# Patient Record
Sex: FEMALE | URBAN - METROPOLITAN AREA
[De-identification: names, ages, dates, MRNs, and addresses within clinical notes are randomized per-mention and may not be internally consistent; named-entity substitution may affect disease eponyms.]

---

## 2017-12-17 ENCOUNTER — INPATIENT (INPATIENT)
Facility: HOSPITAL | Age: 42
LOS: 1 days | Discharge: PSYCHIATRIC OR SUBSTANCE USE FACILITY | End: 2017-12-19
Attending: HOSPITALIST | Admitting: HOSPITALIST
Payer: MEDICAID

## 2017-12-17 VITALS
SYSTOLIC BLOOD PRESSURE: 137 MMHG | RESPIRATION RATE: 16 BRPM | HEIGHT: 62 IN | TEMPERATURE: 99 F | HEART RATE: 67 BPM | DIASTOLIC BLOOD PRESSURE: 73 MMHG | WEIGHT: 132.06 LBS | OXYGEN SATURATION: 100 %

## 2017-12-17 DIAGNOSIS — F10.231 ALCOHOL DEPENDENCE WITH WITHDRAWAL DELIRIUM: ICD-10-CM

## 2017-12-17 DIAGNOSIS — Z90.49 ACQUIRED ABSENCE OF OTHER SPECIFIED PARTS OF DIGESTIVE TRACT: Chronic | ICD-10-CM

## 2017-12-17 DIAGNOSIS — Z98.82 BREAST IMPLANT STATUS: Chronic | ICD-10-CM

## 2017-12-17 DIAGNOSIS — R45.851 SUICIDAL IDEATIONS: ICD-10-CM

## 2017-12-17 DIAGNOSIS — F32.9 MAJOR DEPRESSIVE DISORDER, SINGLE EPISODE, UNSPECIFIED: ICD-10-CM

## 2017-12-17 DIAGNOSIS — F10.10 ALCOHOL ABUSE, UNCOMPLICATED: ICD-10-CM

## 2017-12-17 LAB
ALBUMIN SERPL ELPH-MCNC: 3.7 G/DL — SIGNIFICANT CHANGE UP (ref 3.3–5)
ALBUMIN SERPL ELPH-MCNC: 3.9 G/DL — SIGNIFICANT CHANGE UP (ref 3.3–5)
ALP SERPL-CCNC: 64 U/L — SIGNIFICANT CHANGE UP (ref 40–120)
ALP SERPL-CCNC: 70 U/L — SIGNIFICANT CHANGE UP (ref 40–120)
ALT FLD-CCNC: 14 U/L — SIGNIFICANT CHANGE UP (ref 4–33)
ALT FLD-CCNC: 18 U/L — SIGNIFICANT CHANGE UP (ref 4–33)
AMPHET UR-MCNC: NEGATIVE — SIGNIFICANT CHANGE UP
APAP SERPL-MCNC: < 15 UG/ML — LOW (ref 15–25)
APPEARANCE UR: SIGNIFICANT CHANGE UP
AST SERPL-CCNC: 20 U/L — SIGNIFICANT CHANGE UP (ref 4–32)
AST SERPL-CCNC: 21 U/L — SIGNIFICANT CHANGE UP (ref 4–32)
BACTERIA # UR AUTO: SIGNIFICANT CHANGE UP
BARBITURATES MEASUREMENT: NEGATIVE — SIGNIFICANT CHANGE UP
BARBITURATES UR SCN-MCNC: NEGATIVE — SIGNIFICANT CHANGE UP
BASOPHILS # BLD AUTO: 0.02 K/UL — SIGNIFICANT CHANGE UP (ref 0–0.2)
BASOPHILS NFR BLD AUTO: 0.2 % — SIGNIFICANT CHANGE UP (ref 0–2)
BENZODIAZ SERPL-MCNC: NEGATIVE — SIGNIFICANT CHANGE UP
BENZODIAZ UR-MCNC: NEGATIVE — SIGNIFICANT CHANGE UP
BILIRUB DIRECT SERPL-MCNC: 0.1 MG/DL — SIGNIFICANT CHANGE UP (ref 0.1–0.2)
BILIRUB SERPL-MCNC: 0.2 MG/DL — SIGNIFICANT CHANGE UP (ref 0.2–1.2)
BILIRUB SERPL-MCNC: 0.2 MG/DL — SIGNIFICANT CHANGE UP (ref 0.2–1.2)
BILIRUB UR-MCNC: NEGATIVE — SIGNIFICANT CHANGE UP
BLOOD UR QL VISUAL: HIGH
BUN SERPL-MCNC: 4 MG/DL — LOW (ref 7–23)
BUN SERPL-MCNC: 8 MG/DL — SIGNIFICANT CHANGE UP (ref 7–23)
CALCIUM SERPL-MCNC: 8 MG/DL — LOW (ref 8.4–10.5)
CALCIUM SERPL-MCNC: 8.3 MG/DL — LOW (ref 8.4–10.5)
CANNABINOIDS UR-MCNC: NEGATIVE — SIGNIFICANT CHANGE UP
CHLORIDE SERPL-SCNC: 105 MMOL/L — SIGNIFICANT CHANGE UP (ref 98–107)
CHLORIDE SERPL-SCNC: 107 MMOL/L — SIGNIFICANT CHANGE UP (ref 98–107)
CK MB BLD-MCNC: 1.1 — SIGNIFICANT CHANGE UP (ref 0–2.5)
CK MB BLD-MCNC: 1.72 NG/ML — SIGNIFICANT CHANGE UP (ref 1–4.7)
CK SERPL-CCNC: 152 U/L — SIGNIFICANT CHANGE UP (ref 25–170)
CO2 SERPL-SCNC: 23 MMOL/L — SIGNIFICANT CHANGE UP (ref 22–31)
CO2 SERPL-SCNC: 25 MMOL/L — SIGNIFICANT CHANGE UP (ref 22–31)
COCAINE METAB.OTHER UR-MCNC: NEGATIVE — SIGNIFICANT CHANGE UP
COLOR SPEC: SIGNIFICANT CHANGE UP
CREAT SERPL-MCNC: 0.76 MG/DL — SIGNIFICANT CHANGE UP (ref 0.5–1.3)
CREAT SERPL-MCNC: 1.05 MG/DL — SIGNIFICANT CHANGE UP (ref 0.5–1.3)
EOSINOPHIL # BLD AUTO: 0.24 K/UL — SIGNIFICANT CHANGE UP (ref 0–0.5)
EOSINOPHIL NFR BLD AUTO: 2.7 % — SIGNIFICANT CHANGE UP (ref 0–6)
ETHANOL BLD-MCNC: < 10 MG/DL — SIGNIFICANT CHANGE UP
GLUCOSE SERPL-MCNC: 110 MG/DL — HIGH (ref 70–99)
GLUCOSE SERPL-MCNC: 97 MG/DL — SIGNIFICANT CHANGE UP (ref 70–99)
GLUCOSE UR-MCNC: NEGATIVE — SIGNIFICANT CHANGE UP
HCG SERPL-ACNC: < 5 MIU/ML — SIGNIFICANT CHANGE UP
HCT VFR BLD CALC: 35.9 % — SIGNIFICANT CHANGE UP (ref 34.5–45)
HGB BLD-MCNC: 11.9 G/DL — SIGNIFICANT CHANGE UP (ref 11.5–15.5)
IMM GRANULOCYTES # BLD AUTO: 0.02 # — SIGNIFICANT CHANGE UP
IMM GRANULOCYTES NFR BLD AUTO: 0.2 % — SIGNIFICANT CHANGE UP (ref 0–1.5)
KETONES UR-MCNC: NEGATIVE — SIGNIFICANT CHANGE UP
LEUKOCYTE ESTERASE UR-ACNC: NEGATIVE — SIGNIFICANT CHANGE UP
LITHIUM SERPL-MCNC: 0.46 MMOL/L — LOW (ref 0.6–1.2)
LYMPHOCYTES # BLD AUTO: 1.35 K/UL — SIGNIFICANT CHANGE UP (ref 1–3.3)
LYMPHOCYTES # BLD AUTO: 15.4 % — SIGNIFICANT CHANGE UP (ref 13–44)
MAGNESIUM SERPL-MCNC: 1.9 MG/DL — SIGNIFICANT CHANGE UP (ref 1.6–2.6)
MCHC RBC-ENTMCNC: 30.3 PG — SIGNIFICANT CHANGE UP (ref 27–34)
MCHC RBC-ENTMCNC: 33.1 % — SIGNIFICANT CHANGE UP (ref 32–36)
MCV RBC AUTO: 91.3 FL — SIGNIFICANT CHANGE UP (ref 80–100)
METHADONE UR-MCNC: NEGATIVE — SIGNIFICANT CHANGE UP
MONOCYTES # BLD AUTO: 0.57 K/UL — SIGNIFICANT CHANGE UP (ref 0–0.9)
MONOCYTES NFR BLD AUTO: 6.5 % — SIGNIFICANT CHANGE UP (ref 2–14)
NEUTROPHILS # BLD AUTO: 6.56 K/UL — SIGNIFICANT CHANGE UP (ref 1.8–7.4)
NEUTROPHILS NFR BLD AUTO: 75 % — SIGNIFICANT CHANGE UP (ref 43–77)
NITRITE UR-MCNC: NEGATIVE — SIGNIFICANT CHANGE UP
NRBC # FLD: 0 — SIGNIFICANT CHANGE UP
OPIATES UR-MCNC: NEGATIVE — SIGNIFICANT CHANGE UP
OXYCODONE UR-MCNC: NEGATIVE — SIGNIFICANT CHANGE UP
PCP UR-MCNC: NEGATIVE — SIGNIFICANT CHANGE UP
PH UR: 7 — SIGNIFICANT CHANGE UP (ref 4.6–8)
PHOSPHATE SERPL-MCNC: 2.1 MG/DL — LOW (ref 2.5–4.5)
PLATELET # BLD AUTO: 243 K/UL — SIGNIFICANT CHANGE UP (ref 150–400)
PMV BLD: 9.3 FL — SIGNIFICANT CHANGE UP (ref 7–13)
POTASSIUM SERPL-MCNC: 3.8 MMOL/L — SIGNIFICANT CHANGE UP (ref 3.5–5.3)
POTASSIUM SERPL-MCNC: 3.9 MMOL/L — SIGNIFICANT CHANGE UP (ref 3.5–5.3)
POTASSIUM SERPL-SCNC: 3.8 MMOL/L — SIGNIFICANT CHANGE UP (ref 3.5–5.3)
POTASSIUM SERPL-SCNC: 3.9 MMOL/L — SIGNIFICANT CHANGE UP (ref 3.5–5.3)
PROT SERPL-MCNC: 6 G/DL — SIGNIFICANT CHANGE UP (ref 6–8.3)
PROT SERPL-MCNC: 6.5 G/DL — SIGNIFICANT CHANGE UP (ref 6–8.3)
PROT UR-MCNC: 100 MG/DL — HIGH
RBC # BLD: 3.93 M/UL — SIGNIFICANT CHANGE UP (ref 3.8–5.2)
RBC # FLD: 12.3 % — SIGNIFICANT CHANGE UP (ref 10.3–14.5)
RBC CASTS # UR COMP ASSIST: HIGH (ref 0–?)
SALICYLATES SERPL-MCNC: < 5 MG/DL — LOW (ref 15–30)
SODIUM SERPL-SCNC: 140 MMOL/L — SIGNIFICANT CHANGE UP (ref 135–145)
SODIUM SERPL-SCNC: 142 MMOL/L — SIGNIFICANT CHANGE UP (ref 135–145)
SP GR SPEC: 1.02 — SIGNIFICANT CHANGE UP (ref 1–1.04)
SQUAMOUS # UR AUTO: SIGNIFICANT CHANGE UP
TSH SERPL-MCNC: 2.58 UIU/ML — SIGNIFICANT CHANGE UP (ref 0.27–4.2)
UROBILINOGEN FLD QL: NORMAL MG/DL — SIGNIFICANT CHANGE UP
WBC # BLD: 8.76 K/UL — SIGNIFICANT CHANGE UP (ref 3.8–10.5)
WBC # FLD AUTO: 8.76 K/UL — SIGNIFICANT CHANGE UP (ref 3.8–10.5)
WBC UR QL: HIGH (ref 0–?)

## 2017-12-17 PROCEDURE — 99223 1ST HOSP IP/OBS HIGH 75: CPT | Mod: AI

## 2017-12-17 RX ORDER — SODIUM CHLORIDE 9 MG/ML
1000 INJECTION, SOLUTION INTRAVENOUS
Refills: 0 | Status: DISCONTINUED | OUTPATIENT
Start: 2017-12-17 | End: 2017-12-19

## 2017-12-17 RX ORDER — SODIUM CHLORIDE 9 MG/ML
1000 INJECTION INTRAMUSCULAR; INTRAVENOUS; SUBCUTANEOUS ONCE
Refills: 0 | Status: COMPLETED | OUTPATIENT
Start: 2017-12-17 | End: 2017-12-17

## 2017-12-17 RX ORDER — THIAMINE MONONITRATE (VIT B1) 100 MG
100 TABLET ORAL DAILY
Refills: 0 | Status: DISCONTINUED | OUTPATIENT
Start: 2017-12-18 | End: 2017-12-19

## 2017-12-17 RX ORDER — CLONAZEPAM 1 MG
0.5 TABLET ORAL DAILY
Refills: 0 | Status: DISCONTINUED | OUTPATIENT
Start: 2017-12-17 | End: 2017-12-17

## 2017-12-17 RX ORDER — LITHIUM CARBONATE 300 MG/1
300 TABLET, EXTENDED RELEASE ORAL
Refills: 0 | Status: DISCONTINUED | OUTPATIENT
Start: 2017-12-17 | End: 2017-12-19

## 2017-12-17 RX ORDER — FLUOXETINE HCL 10 MG
20 CAPSULE ORAL DAILY
Refills: 0 | Status: DISCONTINUED | OUTPATIENT
Start: 2017-12-17 | End: 2017-12-18

## 2017-12-17 RX ORDER — CLONAZEPAM 1 MG
0.5 TABLET ORAL AT BEDTIME
Refills: 0 | Status: DISCONTINUED | OUTPATIENT
Start: 2017-12-17 | End: 2017-12-18

## 2017-12-17 RX ORDER — FOLIC ACID 0.8 MG
1 TABLET ORAL DAILY
Refills: 0 | Status: DISCONTINUED | OUTPATIENT
Start: 2017-12-18 | End: 2017-12-19

## 2017-12-17 RX ORDER — INFLUENZA VIRUS VACCINE 15; 15; 15; 15 UG/.5ML; UG/.5ML; UG/.5ML; UG/.5ML
0.5 SUSPENSION INTRAMUSCULAR ONCE
Refills: 0 | Status: COMPLETED | OUTPATIENT
Start: 2017-12-17 | End: 2017-12-18

## 2017-12-17 RX ORDER — TOPIRAMATE 25 MG
50 TABLET ORAL
Refills: 0 | Status: DISCONTINUED | OUTPATIENT
Start: 2017-12-17 | End: 2017-12-19

## 2017-12-17 RX ORDER — TRAZODONE HCL 50 MG
50 TABLET ORAL AT BEDTIME
Refills: 0 | Status: DISCONTINUED | OUTPATIENT
Start: 2017-12-17 | End: 2017-12-19

## 2017-12-17 RX ADMIN — Medication 2 MILLIGRAM(S): at 16:51

## 2017-12-17 RX ADMIN — SODIUM CHLORIDE 1000 MILLILITER(S): 9 INJECTION INTRAMUSCULAR; INTRAVENOUS; SUBCUTANEOUS at 16:50

## 2017-12-17 RX ADMIN — SODIUM CHLORIDE 75 MILLILITER(S): 9 INJECTION, SOLUTION INTRAVENOUS at 21:14

## 2017-12-17 RX ADMIN — LITHIUM CARBONATE 300 MILLIGRAM(S): 300 TABLET, EXTENDED RELEASE ORAL at 21:16

## 2017-12-17 RX ADMIN — Medication 50 MILLIGRAM(S): at 21:16

## 2017-12-17 RX ADMIN — Medication 0.5 MILLIGRAM(S): at 21:13

## 2017-12-17 NOTE — H&P ADULT - PROBLEM SELECTOR PLAN 3
- await psych eval   - would check lithium level  - continue with home medications for now  - would decrease trazodone to 50 mg - it was recently increased to 100 mg and pt feels she has been having more trouble sleeping since the increase

## 2017-12-17 NOTE — H&P ADULT - HISTORY OF PRESENT ILLNESS
42F with depression and anxiety presenting with binge drinking from thursday afternoon to friday night due to relationship stressors.  She does not know the amount that she drank but there was Vodka, Whiskey and beer consumed.  She does not drink on a daily basis - prior to this binge drinking episode she last drank the first week of december.  She has never been hospitalized for etoh withdrawal.  Currently she is having tremors but is able to speak in full coherent sentences.    She came to the hospital because she began to have hallucinations yesterday - she was seeing people, animals and objects in the room with her.  She denies auditory hallucinations.  she denies homicidal ideations but does say she has thought about suicide multiple times the past few days.  She states if she were to commit suicide she would use a butterfly knife.  She has a history of self-injurious behavior during her teenage years when she used to cut her wrists (scars on L wrist present).    Of note, pt's trazodone was recently increased from 50 mg to 100 mg at night.

## 2017-12-17 NOTE — ED ADULT NURSE NOTE - OBJECTIVE STATEMENT
Immediate Brief Procedure Note    Patient: Trina Corcoran    Preoperative Diagnosis: Epigastric abdominal pain [R10.13]  History of colon polyps [Z86.010]     Postoperative Diagnosis: Epigastric abdominal pain [R10.13]  History of colon polyps [Z86.010]    Procedure: Procedure(s) (LRB):  ESOPHAGOGASTRODUODENOSCOPY (N/A)  COLONOSCOPY (N/A)     Surgeon:  Matthew Mcdaniel MD    Assistants: None    Anesthesia Staff: RN or CRNA    Anesthesia Type: MAC    Findings: EGD showed small hiatal hernia, 5 mm mid esophageal nodule suggesting a squamous papilloma  Colonoscopy showed left-sided diverticulosis, tortuosity sigmoid colon and small colon polyps    Estimated Blood Loss: None    Complications: None    Specimens Removed: Esophageal biopsies, colon polyps             Pt to bed 27. Alert and oriented x 3. Pt c/o SI and tremors from alcohol use. Pt cooperative. Pt placed on constant observation. All belongings collected and placed in locker across from rm 21. IVL placed. Bloods drawn. Safety maintained. Will continue to monitor.

## 2017-12-17 NOTE — H&P ADULT - NSHPPHYSICALEXAM_GEN_ALL_CORE
Vital Signs Last 24 Hrs  T(C): 36.8 (17 Dec 2017 17:03), Max: 37.2 (17 Dec 2017 16:07)  T(F): 98.3 (17 Dec 2017 17:03), Max: 98.9 (17 Dec 2017 16:07)  HR: 60 (17 Dec 2017 17:03) (60 - 67)  BP: 135/75 (17 Dec 2017 17:03) (135/75 - 137/73)  BP(mean): --  RR: 18 (17 Dec 2017 17:03) (16 - 18)  SpO2: 100% (17 Dec 2017 17:03) (100% - 100%)    PHYSICAL EXAM:  GENERAL: NAD, well-developed  HEAD:  Atraumatic, Normocephalic  EYES: EOMI, conjunctiva and sclera clear  NECK: Supple, No JVD  CHEST/LUNG: Clear to auscultation bilaterally; No wheeze  HEART: Regular rate and rhythm; No murmurs  ABDOMEN: Soft, Nontender, Nondistended; Bowel sounds present  EXTREMITIES:  2+ Peripheral Pulses, No edema; + asterixis   PSYCH: AAOx3  NEUROLOGY: non-focal  SKIN: No rashes or lesions Vital Signs Last 24 Hrs  T(C): 36.8 (17 Dec 2017 17:03), Max: 37.2 (17 Dec 2017 16:07)  T(F): 98.3 (17 Dec 2017 17:03), Max: 98.9 (17 Dec 2017 16:07)  HR: 60 (17 Dec 2017 17:03) (60 - 67)  BP: 135/75 (17 Dec 2017 17:03) (135/75 - 137/73)  BP(mean): --  RR: 18 (17 Dec 2017 17:03) (16 - 18)  SpO2: 100% (17 Dec 2017 17:03) (100% - 100%)    PHYSICAL EXAM:  GENERAL: NAD, well-developed  HEAD:  Atraumatic, Normocephalic  EYES: EOMI, conjunctiva and sclera clear  NECK: Supple, No JVD  CHEST/LUNG: Clear to auscultation bilaterally; No wheeze  HEART: Regular rate and rhythm; No murmurs  ABDOMEN: Soft, Nontender, Nondistended; Bowel sounds present  EXTREMITIES:  2+ Peripheral Pulses, No edema; + asterixis; + scars on L wrist   PSYCH: AAOx3  NEUROLOGY: non-focal  SKIN: No rashes or lesions

## 2017-12-17 NOTE — ED PROVIDER NOTE - OBJECTIVE STATEMENT
41 y/o F with h/o  csection x2, cholecystectomy, breast augmentation, depression, anxiety, alcohol abuse with treatment for rehab at age of 14-15, multiple psych admissions p/w worsening depression for few days and then started binge drinking from Thursday to friday and now feels worse and feels suicidal and wants to hurt herself but has no plan. Pt moved from [place] back to  recently and lives with parents.Pt now feels very weak, shaky and also smoked marijuana to relax herself. Pt denies other drug abuse, denies pregnancy

## 2017-12-17 NOTE — ED PROVIDER NOTE - PMH
Alcohol abuse    Anxiety    Severe episode of recurrent major depressive disorder, with psychotic features

## 2017-12-17 NOTE — ED PROVIDER NOTE - CRANIAL NERVE AND PUPILLARY EXAM
cranial nerves 2-12 intact/cough reflex intact/corneal reflex intact/central and peripheral vision intact/peripheral vision intact/extra-ocular movements intact/gag reflex intact/tongue is midline

## 2017-12-17 NOTE — ED PROVIDER NOTE - MEDICAL DECISION MAKING DETAILS
PLAN TO CHECK LABS, hydrate, give ativan, will place on 1:1 for suicidal ideation and will consult psych

## 2017-12-17 NOTE — ED ADULT TRIAGE NOTE - CHIEF COMPLAINT QUOTE
Pt. presents to ER for depression and anxiety. States she has been having visual hallucinations x 1 week and SI starting today. h/o SI attempts of cutting in past. Recently started taking Trazadone. Pt. crying in triage.   Pt. also binge drank thurs-friday and is going through withdrawal. Tremors seen in triage. Pt. calm and cooperative.

## 2017-12-17 NOTE — H&P ADULT - PROBLEM SELECTOR PLAN 1
- would start symptom triggered CIWA   - depending on scores she may need a taper added   - MVI, folate, thiamine

## 2017-12-17 NOTE — H&P ADULT - NSHPREVIEWOFSYSTEMS_GEN_ALL_CORE
CONSTITUTIONAL: No fever, weight loss, or fatigue  EYES: No eye pain or discharge  ENMT:  No difficulty hearing, tinnitus, vertigo; No sinus or throat pain  NECK: No pain or stiffness  BREASTS: No pain, masses, or nipple discharge  RESPIRATORY: No cough, wheezing, chills or hemoptysis; No shortness of breath  CARDIOVASCULAR: No chest pain, palpitations, dizziness, or leg swelling  GASTROINTESTINAL: No abdominal or epigastric pain. No nausea, vomiting, or hematemesis; No diarrhea or constipation. No melena or hematochezia.  GENITOURINARY: No dysuria, frequency, hematuria, or incontinence  NEUROLOGICAL: No headaches, memory loss, loss of strength, numbness, or tremors  SKIN: No itching, burning, rashes, or lesions   LYMPH NODES: No enlarged glands  ENDOCRINE: No heat or cold intolerance; No hair loss  MUSCULOSKELETAL: No joint pain or swelling; No muscle, back, or extremity pain; + tremors in UEs  PSYCHIATRIC: + depression, anxiety, and difficulty sleeping; + visual hallucinations + SI  HEME/LYMPH: No easy bruising, or bleeding gums  ALLERY AND IMMUNOLOGIC: No hives or eczema

## 2017-12-17 NOTE — H&P ADULT - NSHPLABSRESULTS_GEN_ALL_CORE
LABS:                        11.9   8.76  )-----------( 243      ( 17 Dec 2017 16:32 )             35.9     12-17    142  |  107  |  4<L>  ----------------------------<  97  3.9   |  23  |  0.76    Ca    8.3<L>      17 Dec 2017 16:32    TPro  6.5  /  Alb  3.9  /  TBili  0.2  /  DBili  x   /  AST  20  /  ALT  18  /  AlkPhos  70  12-17    CARDIAC MARKERS ( 17 Dec 2017 16:32 )  x     / x     / 152 u/L / 1.72 ng/mL / x          Urinalysis Basic - ( 17 Dec 2017 17:26 )    Color: PLYEL / Appearance: TURBID / S.017 / pH: 7.0  Gluc: NEGATIVE / Ketone: NEGATIVE  / Bili: NEGATIVE / Urobili: NORMAL mg/dL   Blood: SMALL / Protein: 100 mg/dL / Nitrite: NEGATIVE   Leuk Esterase: NEGATIVE / RBC: 5-10 / WBC 5-10   Sq Epi: MANY / Non Sq Epi: x / Bacteria: FEW

## 2017-12-18 ENCOUNTER — TRANSCRIPTION ENCOUNTER (OUTPATIENT)
Age: 42
End: 2017-12-18

## 2017-12-18 DIAGNOSIS — F60.9 PERSONALITY DISORDER, UNSPECIFIED: ICD-10-CM

## 2017-12-18 LAB
BASOPHILS # BLD AUTO: 0.03 K/UL — SIGNIFICANT CHANGE UP (ref 0–0.2)
BASOPHILS NFR BLD AUTO: 0.4 % — SIGNIFICANT CHANGE UP (ref 0–2)
BUN SERPL-MCNC: 8 MG/DL — SIGNIFICANT CHANGE UP (ref 7–23)
CALCIUM SERPL-MCNC: 7.6 MG/DL — LOW (ref 8.4–10.5)
CHLORIDE SERPL-SCNC: 110 MMOL/L — HIGH (ref 98–107)
CO2 SERPL-SCNC: 26 MMOL/L — SIGNIFICANT CHANGE UP (ref 22–31)
CREAT SERPL-MCNC: 0.84 MG/DL — SIGNIFICANT CHANGE UP (ref 0.5–1.3)
EOSINOPHIL # BLD AUTO: 0.35 K/UL — SIGNIFICANT CHANGE UP (ref 0–0.5)
EOSINOPHIL NFR BLD AUTO: 4.5 % — SIGNIFICANT CHANGE UP (ref 0–6)
GLUCOSE SERPL-MCNC: 77 MG/DL — SIGNIFICANT CHANGE UP (ref 70–99)
HCT VFR BLD CALC: 32.3 % — LOW (ref 34.5–45)
HGB BLD-MCNC: 10.7 G/DL — LOW (ref 11.5–15.5)
IMM GRANULOCYTES # BLD AUTO: 0.02 # — SIGNIFICANT CHANGE UP
IMM GRANULOCYTES NFR BLD AUTO: 0.3 % — SIGNIFICANT CHANGE UP (ref 0–1.5)
LYMPHOCYTES # BLD AUTO: 1.37 K/UL — SIGNIFICANT CHANGE UP (ref 1–3.3)
LYMPHOCYTES # BLD AUTO: 17.6 % — SIGNIFICANT CHANGE UP (ref 13–44)
MCHC RBC-ENTMCNC: 31.5 PG — SIGNIFICANT CHANGE UP (ref 27–34)
MCHC RBC-ENTMCNC: 33.1 % — SIGNIFICANT CHANGE UP (ref 32–36)
MCV RBC AUTO: 95 FL — SIGNIFICANT CHANGE UP (ref 80–100)
MONOCYTES # BLD AUTO: 0.61 K/UL — SIGNIFICANT CHANGE UP (ref 0–0.9)
MONOCYTES NFR BLD AUTO: 7.8 % — SIGNIFICANT CHANGE UP (ref 2–14)
NEUTROPHILS # BLD AUTO: 5.4 K/UL — SIGNIFICANT CHANGE UP (ref 1.8–7.4)
NEUTROPHILS NFR BLD AUTO: 69.4 % — SIGNIFICANT CHANGE UP (ref 43–77)
NRBC # FLD: 0 — SIGNIFICANT CHANGE UP
PLATELET # BLD AUTO: 176 K/UL — SIGNIFICANT CHANGE UP (ref 150–400)
PMV BLD: 9.5 FL — SIGNIFICANT CHANGE UP (ref 7–13)
POTASSIUM SERPL-MCNC: 4.1 MMOL/L — SIGNIFICANT CHANGE UP (ref 3.5–5.3)
POTASSIUM SERPL-SCNC: 4.1 MMOL/L — SIGNIFICANT CHANGE UP (ref 3.5–5.3)
RBC # BLD: 3.4 M/UL — LOW (ref 3.8–5.2)
RBC # FLD: 12.4 % — SIGNIFICANT CHANGE UP (ref 10.3–14.5)
SODIUM SERPL-SCNC: 142 MMOL/L — SIGNIFICANT CHANGE UP (ref 135–145)
WBC # BLD: 7.78 K/UL — SIGNIFICANT CHANGE UP (ref 3.8–10.5)
WBC # FLD AUTO: 7.78 K/UL — SIGNIFICANT CHANGE UP (ref 3.8–10.5)

## 2017-12-18 PROCEDURE — 99255 IP/OBS CONSLTJ NEW/EST HI 80: CPT

## 2017-12-18 RX ORDER — HALOPERIDOL DECANOATE 100 MG/ML
2 INJECTION INTRAMUSCULAR EVERY 6 HOURS
Refills: 0 | Status: DISCONTINUED | OUTPATIENT
Start: 2017-12-18 | End: 2017-12-19

## 2017-12-18 RX ORDER — TRAZODONE HCL 50 MG
50 TABLET ORAL ONCE
Refills: 0 | Status: COMPLETED | OUTPATIENT
Start: 2017-12-18 | End: 2017-12-18

## 2017-12-18 RX ORDER — TRAZODONE HCL 50 MG
1 TABLET ORAL
Qty: 0 | Refills: 0 | DISCHARGE
Start: 2017-12-18

## 2017-12-18 RX ORDER — CLONAZEPAM 1 MG
0.5 TABLET ORAL
Refills: 0 | Status: DISCONTINUED | OUTPATIENT
Start: 2017-12-18 | End: 2017-12-19

## 2017-12-18 RX ORDER — THIAMINE MONONITRATE (VIT B1) 100 MG
1 TABLET ORAL
Qty: 0 | Refills: 0 | DISCHARGE
Start: 2017-12-18

## 2017-12-18 RX ORDER — FOLIC ACID 0.8 MG
1 TABLET ORAL
Qty: 0 | Refills: 0 | DISCHARGE
Start: 2017-12-18

## 2017-12-18 RX ADMIN — Medication 100 MILLIGRAM(S): at 11:34

## 2017-12-18 RX ADMIN — Medication 0.5 MILLIGRAM(S): at 17:07

## 2017-12-18 RX ADMIN — SODIUM CHLORIDE 75 MILLILITER(S): 9 INJECTION, SOLUTION INTRAVENOUS at 05:33

## 2017-12-18 RX ADMIN — Medication 50 MILLIGRAM(S): at 21:08

## 2017-12-18 RX ADMIN — LITHIUM CARBONATE 300 MILLIGRAM(S): 300 TABLET, EXTENDED RELEASE ORAL at 17:07

## 2017-12-18 RX ADMIN — Medication 20 MILLIGRAM(S): at 05:36

## 2017-12-18 RX ADMIN — Medication 1 TABLET(S): at 11:34

## 2017-12-18 RX ADMIN — Medication 1 MILLIGRAM(S): at 14:32

## 2017-12-18 RX ADMIN — Medication 50 MILLIGRAM(S): at 17:08

## 2017-12-18 RX ADMIN — Medication 1 MILLIGRAM(S): at 11:34

## 2017-12-18 RX ADMIN — Medication 1 MILLIGRAM(S): at 22:22

## 2017-12-18 RX ADMIN — Medication 50 MILLIGRAM(S): at 01:05

## 2017-12-18 RX ADMIN — INFLUENZA VIRUS VACCINE 0.5 MILLILITER(S): 15; 15; 15; 15 SUSPENSION INTRAMUSCULAR at 13:07

## 2017-12-18 RX ADMIN — Medication 50 MILLIGRAM(S): at 05:32

## 2017-12-18 RX ADMIN — LITHIUM CARBONATE 300 MILLIGRAM(S): 300 TABLET, EXTENDED RELEASE ORAL at 05:32

## 2017-12-18 NOTE — CHART NOTE - NSCHARTNOTEFT_GEN_A_CORE
Received 50mg Trazodone as ordered and states still can't sleep.  Her recently increased dose to 100mg was decreased 2/2 complaints "pt feels she has been having more trouble sleeping since the increase", per H&P.  Will now give additional dose of 50mg Trazodone to make up the 100mg and monitor for reaction to Rx.

## 2017-12-18 NOTE — BEHAVIORAL HEALTH ASSESSMENT NOTE - NSBHMEDSOTHERFT_PSY_A_CORE
Li 300mg BID, prozac 20mg daily, klonopin 0.5mg QHS, trazodone 100mg QHS, Topamax 50mg BID (for migraines)

## 2017-12-18 NOTE — BEHAVIORAL HEALTH ASSESSMENT NOTE - NSBHADMITIPSTRENGTH_PSY_A_CORE
Attempting to realize his/her potential/Has access to housing/residential stability/Cooperative with treatment

## 2017-12-18 NOTE — DISCHARGE NOTE ADULT - MEDICATION SUMMARY - MEDICATIONS TO TAKE
I will START or STAY ON the medications listed below when I get home from the hospital:    topiramate 50 mg oral tablet  -- 1 tab(s) by mouth 2 times a day  -- Indication: For MDD    KlonoPIN 0.5 mg oral tablet  -- 1 tab(s) by mouth once a day at bedtime   -- Indication: For Anxiety    traZODone 50 mg oral tablet  -- 1 tab(s) by mouth once a day (at bedtime)  -- Indication: For Depression      PROzac 20 mg oral capsule  -- 1 cap(s) by mouth once a day  -- Indication: For Depression, unspecified depression type    lithium 300 mg oral capsule  -- orally 2 times a day  -- Indication: For Depresion    Multiple Vitamins oral tablet  -- 1 tab(s) by mouth once a day  -- Indication: For Suppelement    folic acid 1 mg oral tablet  -- 1 tab(s) by mouth once a day  -- Indication: For Supplement    thiamine 100 mg oral tablet  -- 1 tab(s) by mouth once a day  -- Indication: For Supplement I will START or STAY ON the medications listed below when I get home from the hospital:    topiramate 50 mg oral tablet  -- 1 tab(s) by mouth 2 times a day  -- Indication: For MDD    LORazepam 1 mg oral tablet  -- 1 tab(s) by mouth 4 times a day, As needed, Anxiety  -- Indication: For Anxiety    clonazePAM 0.5 mg oral tablet  -- 1 tab(s) by mouth 2 times a day  -- Indication: For Depression, unspecified depression type    PROzac 20 mg oral capsule  -- 1 cap(s) by mouth once a day  -- Indication: For Depression, unspecified depression type    traZODone 50 mg oral tablet  -- 1 tab(s) by mouth once a day (at bedtime)  -- Indication: For Depression      lithium 300 mg oral capsule  -- orally 2 times a day  -- Indication: For Depresion    Multiple Vitamins oral tablet  -- 1 tab(s) by mouth once a day  -- Indication: For Suppelement    folic acid 1 mg oral tablet  -- 1 tab(s) by mouth once a day  -- Indication: For Supplement    thiamine 100 mg oral tablet  -- 1 tab(s) by mouth once a day  -- Indication: For Supplement I will START or STAY ON the medications listed below when I get home from the hospital:    topiramate 50 mg oral tablet  -- 1 tab(s) by mouth 2 times a day  -- Indication: For MDD    LORazepam 1 mg oral tablet  -- 1 tab(s) by mouth 4 times a day, As needed, Anxiety  -- Indication: For Anxiety    clonazePAM 0.5 mg oral tablet  -- 1 tab(s) by mouth 2 times a day  -- Indication: For Depression, unspecified depression type    traZODone 50 mg oral tablet  -- 1 tab(s) by mouth once a day (at bedtime)  -- Indication: For Depression      lithium 300 mg oral capsule  -- orally 2 times a day  -- Indication: For Depresion    Multiple Vitamins oral tablet  -- 1 tab(s) by mouth once a day  -- Indication: For Suppelement    folic acid 1 mg oral tablet  -- 1 tab(s) by mouth once a day  -- Indication: For Supplement    thiamine 100 mg oral tablet  -- 1 tab(s) by mouth once a day  -- Indication: For Supplement

## 2017-12-18 NOTE — BEHAVIORAL HEALTH ASSESSMENT NOTE - RISK ASSESSMENT
Risk factors include hx of suicide attempt, disconnected from outpatient treatment, fam hx suicide, acute stressors, unemployed, substance abuse, unable to engage in safety planning.

## 2017-12-18 NOTE — BEHAVIORAL HEALTH ASSESSMENT NOTE - SUMMARY
Patient is a 41 y/o  F with reported PPhx of "chronic depression but rule out bipolar d/o", PMDD, post-traumatic stress disorder, and ETOH abuse, 2 prior hospitalizations 1 at [facility] and last one in [place] 1 year ago, one reported suicide attempt at age 16 by slicing wrists, no hx of violence/legal problems, PMhx significant for migraine HA. Patient presented to ED endorsing worsening depressed mood and suicidal ideation in the context of multiple acute stressors and heavy alcohol use. Patient was admitted due to concern for ETOH w/d however pt has been medically stable during hospitalization with no signs/symptoms of withdrawal. Patient reports active suicidal ideation at present with aborted attempt yesterday to slice a major artery with a butterfly knife, and envisions herself dying in a car accident like her ex- did (anniversary of his death is next week). At this time pt is at imminent risk of harm and requires inpt psych hospitalization for safety and stabilization. Patient is agreeable to voluntary admission. Bed secured at [facility] unit 2N and handoff provided to Dr. Marie.

## 2017-12-18 NOTE — DISCHARGE NOTE ADULT - PLAN OF CARE
resolution of delirium folic acid, thiamine, and multi vitamin Being admitted to  for further treatment.

## 2017-12-18 NOTE — DISCHARGE NOTE ADULT - HOSPITAL COURSE
42F with depression and anxiety presenting with binge drinking from Thursday afternoon to Friday night due to relationship stressors.  She does not know the amount that she drank but there was Vodka, Whiskey and beer consumed.  She does not drink on a daily basis - prior to this binge drinking episode she last drank the first week of december.  She has never been hospitalized for ETOH withdrawal.  Currently she is having tremors but is able to speak in full coherent sentences.    She came to the hospital because she began to have hallucinations yesterday - she was seeing people, animals and objects in the room with her.  She denies auditory hallucinations.  she denies homicidal ideations but does say she has thought about suicide multiple times the past few days.  She states if she were to commit suicide she would use a butterfly knife.  She has a history of self-injurious behavior during her teenage years when she used to cut her wrists (scars on L wrist present).  Pt admitted to [facility] and was placed on constant observation and started on CIWA protocol. Psychiatry has seen the patient and deemed her a candidate for inpatient psych admission. Pt is now medically cleared for transfer to [facility].

## 2017-12-18 NOTE — BEHAVIORAL HEALTH ASSESSMENT NOTE - NSBHSUICRISKFACTOR_PSY_A_CORE
Hopelessness/Mood episode/Substance abuse/dependence/Agitation/severe anxiety/Perceived burden on family and others/Chronic pain or acute medical issue/Access to means (pills, firearms, etc.)/Unable to engage in safety planning/Family history of suicide/Global insomnia/Anhedonia/Impulsivity
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
## 2017-12-18 NOTE — BEHAVIORAL HEALTH ASSESSMENT NOTE - DIFFERENTIAL
major depressive disorder recurrent vs bipolar d/o depressed vs substance induced depressive disorder, r/o borderline PD

## 2017-12-18 NOTE — DISCHARGE NOTE ADULT - CARE PLAN
Principal Discharge DX:	Alcohol withdrawal delirium  Goal:	resolution of delirium  Instructions for follow-up, activity and diet:	folic acid, thiamine, and multi vitamin  Secondary Diagnosis:	Suicidal ideation  Instructions for follow-up, activity and diet:	Being admitted to [facility] for further treatment.

## 2017-12-18 NOTE — DISCHARGE NOTE ADULT - PATIENT PORTAL LINK FT
“You can access the FollowHealth Patient Portal, offered by [facility], by registering with the following website: [email or web address]”

## 2017-12-18 NOTE — BEHAVIORAL HEALTH ASSESSMENT NOTE - CASE SUMMARY
43 yo female with major depressive disorder with suicidal thoughts, alcohol abuse, need inpatient psychiatric admission. Will admit to [facility]   continue current meds, sign out give to Dr. Marie

## 2017-12-18 NOTE — DISCHARGE NOTE ADULT - MEDICATION SUMMARY - MEDICATIONS TO STOP TAKING
I will STOP taking the medications listed below when I get home from the hospital:  None I will STOP taking the medications listed below when I get home from the hospital:    PROzac 20 mg oral capsule  -- 1 cap(s) by mouth once a day

## 2017-12-18 NOTE — BEHAVIORAL HEALTH ASSESSMENT NOTE - OTHER
3 times breakup with BF, moving back in with parents, anniversary of first 's death next week thin intact in hospital setting pt in bed tearful

## 2017-12-18 NOTE — BEHAVIORAL HEALTH ASSESSMENT NOTE - NSBHCHARTREVIEWVS_PSY_A_CORE FT
Vital Signs Last 24 Hrs  T(C): 37.2 (18 Dec 2017 09:45), Max: 37.2 (17 Dec 2017 16:07)  T(F): 98.9 (18 Dec 2017 09:45), Max: 98.9 (17 Dec 2017 16:07)  HR: 70 (18 Dec 2017 09:45) (53 - 78)  BP: 114/81 (18 Dec 2017 09:45) (90/56 - 137/73)  BP(mean): --  RR: 18 (18 Dec 2017 09:45) (16 - 18)  SpO2: 100% (18 Dec 2017 09:45) (100% - 100%)

## 2017-12-18 NOTE — CHART NOTE - NSCHARTNOTEFT_GEN_A_CORE
Patient seen and examined. Psychiatry saw the patient this morning and deemed her a candidate for inpatient psych admission. Pt is now medically cleared for transfer to [facility].   See discharge summary in sunrise.   Time spent discharging patient >60min.  Case discussed with patient and TelePA.

## 2017-12-18 NOTE — BEHAVIORAL HEALTH ASSESSMENT NOTE - NSBHCHARTREVIEWLAB_PSY_A_CORE FT
12-18    142  |  110<H>  |  8   ----------------------------<  77  4.1   |  26  |  0.84    Ca    7.6<L>      18 Dec 2017 07:31  Phos  2.1     12-17  Mg     1.9     12-17    TPro  6.0  /  Alb  3.7  /  TBili  0.2  /  DBili  0.1  /  AST  21  /  ALT  14  /  AlkPhos  64  12-17                          10.7   7.78  )-----------( 176      ( 18 Dec 2017 07:31 )             32.3     Ca 8.0  Mg 1.9  TSH 2.58  UA negative  Utox negative  Li level last night 7pm: 0.46
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
## 2017-12-19 ENCOUNTER — INPATIENT (INPATIENT)
Facility: HOSPITAL | Age: 42
LOS: 2 days | Discharge: HOME | End: 2017-12-22
Attending: STUDENT IN AN ORGANIZED HEALTH CARE EDUCATION/TRAINING PROGRAM | Admitting: STUDENT IN AN ORGANIZED HEALTH CARE EDUCATION/TRAINING PROGRAM
Payer: MEDICAID

## 2017-12-19 VITALS
HEART RATE: 76 BPM | RESPIRATION RATE: 16 BRPM | OXYGEN SATURATION: 99 % | TEMPERATURE: 98 F | DIASTOLIC BLOOD PRESSURE: 69 MMHG | SYSTOLIC BLOOD PRESSURE: 102 MMHG

## 2017-12-19 VITALS — RESPIRATION RATE: 17 BRPM | TEMPERATURE: 98 F | HEIGHT: 62 IN | WEIGHT: 106.04 LBS

## 2017-12-19 DIAGNOSIS — Z98.82 BREAST IMPLANT STATUS: Chronic | ICD-10-CM

## 2017-12-19 DIAGNOSIS — Z29.9 ENCOUNTER FOR PROPHYLACTIC MEASURES, UNSPECIFIED: ICD-10-CM

## 2017-12-19 DIAGNOSIS — F33.2 MAJOR DEPRESSIVE DISORDER, RECURRENT SEVERE WITHOUT PSYCHOTIC FEATURES: ICD-10-CM

## 2017-12-19 DIAGNOSIS — Z90.49 ACQUIRED ABSENCE OF OTHER SPECIFIED PARTS OF DIGESTIVE TRACT: Chronic | ICD-10-CM

## 2017-12-19 PROCEDURE — 99233 SBSQ HOSP IP/OBS HIGH 50: CPT

## 2017-12-19 PROCEDURE — 99222 1ST HOSP IP/OBS MODERATE 55: CPT

## 2017-12-19 RX ORDER — THIAMINE MONONITRATE (VIT B1) 100 MG
100 TABLET ORAL DAILY
Refills: 0 | Status: DISCONTINUED | OUTPATIENT
Start: 2017-12-19 | End: 2017-12-22

## 2017-12-19 RX ORDER — FOLIC ACID 0.8 MG
1 TABLET ORAL DAILY
Refills: 0 | Status: DISCONTINUED | OUTPATIENT
Start: 2017-12-19 | End: 2017-12-22

## 2017-12-19 RX ORDER — CLONAZEPAM 1 MG
0.5 TABLET ORAL
Refills: 0 | Status: DISCONTINUED | OUTPATIENT
Start: 2017-12-19 | End: 2017-12-22

## 2017-12-19 RX ORDER — CLONAZEPAM 1 MG
1 TABLET ORAL
Qty: 0 | Refills: 0 | DISCHARGE
Start: 2017-12-19

## 2017-12-19 RX ORDER — TRAZODONE HCL 50 MG
50 TABLET ORAL AT BEDTIME
Refills: 0 | Status: DISCONTINUED | OUTPATIENT
Start: 2017-12-19 | End: 2017-12-22

## 2017-12-19 RX ORDER — LITHIUM CARBONATE 300 MG/1
300 TABLET, EXTENDED RELEASE ORAL
Refills: 0 | Status: DISCONTINUED | OUTPATIENT
Start: 2017-12-19 | End: 2017-12-20

## 2017-12-19 RX ORDER — TOPIRAMATE 25 MG
50 TABLET ORAL
Refills: 0 | Status: DISCONTINUED | OUTPATIENT
Start: 2017-12-19 | End: 2017-12-22

## 2017-12-19 RX ADMIN — Medication 1 MILLIGRAM(S): at 11:24

## 2017-12-19 RX ADMIN — LITHIUM CARBONATE 300 MILLIGRAM(S): 300 TABLET, EXTENDED RELEASE ORAL at 22:10

## 2017-12-19 RX ADMIN — LITHIUM CARBONATE 300 MILLIGRAM(S): 300 TABLET, EXTENDED RELEASE ORAL at 05:35

## 2017-12-19 RX ADMIN — Medication 0.5 MILLIGRAM(S): at 05:35

## 2017-12-19 RX ADMIN — Medication 50 MILLIGRAM(S): at 22:10

## 2017-12-19 RX ADMIN — Medication 100 MILLIGRAM(S): at 11:25

## 2017-12-19 RX ADMIN — Medication 0.5 MILLIGRAM(S): at 22:10

## 2017-12-19 RX ADMIN — Medication 1 TABLET(S): at 11:24

## 2017-12-19 RX ADMIN — Medication 1 MILLIGRAM(S): at 23:15

## 2017-12-19 RX ADMIN — Medication 50 MILLIGRAM(S): at 05:35

## 2017-12-19 NOTE — PROGRESS NOTE BEHAVIORAL HEALTH - NSBHFUPINTERVALHXFT_PSY_A_CORE
At this time, pt is at imminent risk of harm and requires inpt psych hospitalization for safety and stabilization. Patient is agreeable to voluntary admission since yesterday. She is awaiting insurance authorization for transfer, hence, patient was unable to be discharged yesterday. Patient received Ativan 1mg PO 10:20PM yesterday. Once patient is received at [facility], would recommend contacting Dr. Reyna, pt's [place], [place] psychiatrist. Patient had no complaints, was calm and cooperative during evaluation with 1:1 CO at bedside. Denied current SI/HI/AH/VH/paranoia. She felt well-cared for in hospital.

## 2017-12-19 NOTE — PROGRESS NOTE ADULT - SUBJECTIVE AND OBJECTIVE BOX
CHIEF COMPLAINT: Patient is a 42y old  female who presents with a chief complaint of suicidal (18 Dec 2017 11:02)    SUBJECTIVE / OVERNIGHT EVENTS: Patient seen and examined. No acute events overnight. Pain well controlled and patient without any complaints.     MEDICATIONS  (STANDING):  clonazePAM Tablet 0.5 milliGRAM(s) Oral two times a day  folic acid 1 milliGRAM(s) Oral daily  lithium 300 milliGRAM(s) Oral two times a day  multivitamin 1 Tablet(s) Oral daily  sodium chloride 0.9% 1000 milliLiter(s) (75 mL/Hr) IV Continuous <Continuous>  thiamine 100 milliGRAM(s) Oral daily  topiramate 50 milliGRAM(s) Oral two times a day  traZODone 50 milliGRAM(s) Oral at bedtime    MEDICATIONS  (PRN):  haloperidol    Injectable 2 milliGRAM(s) IV Push every 6 hours PRN agiatiton  haloperidol    Injectable 2 milliGRAM(s) IntraMuscular every 6 hours PRN agiation  LORazepam     Tablet 1 milliGRAM(s) Oral four times a day PRN Anxiety  LORazepam   Injectable 2 milliGRAM(s) IV Push every 2 hours PRN CIWA-Ar score increase by 2 points and a total score of 7 or less  LORazepam   Injectable 2 milliGRAM(s) IV Push every 1 hour PRN CIWA-Ar score 8 or greater    VITALS:  T(F): 98.4 (17 @ 09:09), Max: 98.6 (17 @ 05:00)  HR: 77 (17 @ 09:09) (63 - 77)  BP: 100/66 (17 @ 09:09) (97/63 - 129/82)  RR: 16 (17 @ 09:09) (16 - 18)  SpO2: 100% (17 @ 09:09)    PHYSICAL EXAM:  GENERAL: NAD, well-developed  CHEST/LUNG: Clear to auscultation bilaterally; No wheeze  HEART: Regular rate and rhythm; No murmurs, rubs, or gallops  ABDOMEN: Soft, Nontender, Nondistended; Bowel sounds present  EXTREMITIES:  2+ Peripheral Pulses, No clubbing, cyanosis, or edema  PSYCH: AAOx3; depressed mood    LABS:              10.7                 142  | 26   | 8            7.78  >-----------< 176     ------------------------< 77                    32.3                 4.1  | 110  | 0.84                                         Ca 7.6   Mg x     Ph x           TPro  6.0  /  Alb  3.7      TBili  0.2  /  DBili  0.1        AST  21  /  ALT  14            AlkPhos  64      CARDIAC MARKERS  x     / 152 u/L / 1.72 ng/mL    Urinalysis Basic - ( 17 Dec 2017 17:26 )  Color: PLYEL / Appearance: TURBID / S.017 / pH: 7.0  Gluc: NEGATIVE / Ketone: NEGATIVE  / Bili: NEGATIVE / Urobili: NORMAL mg/dL   Blood: SMALL / Protein: 100 mg/dL / Nitrite: NEGATIVE   Leuk Esterase: NEGATIVE / RBC: 5-10 / WBC 5-10   Sq Epi: MANY / Non Sq Epi: x / Bacteria: FEW    [x] Consultant(s) Notes Reviewed: Psychiatry  [ ] Care Discussed with Consultants/Other Providers:

## 2017-12-19 NOTE — PROGRESS NOTE BEHAVIORAL HEALTH - NSBHCHARTREVIEWVS_PSY_A_CORE FT
Vital Signs Last 24 Hrs  T(C): 36.8 (19 Dec 2017 13:00), Max: 37 (19 Dec 2017 05:00)  T(F): 98.3 (19 Dec 2017 13:00), Max: 98.6 (19 Dec 2017 05:00)  HR: 76 (19 Dec 2017 13:00) (63 - 77)  BP: 102/69 (19 Dec 2017 13:00) (97/63 - 129/82)  BP(mean): --  RR: 16 (19 Dec 2017 13:00) (16 - 16)  SpO2: 99% (19 Dec 2017 13:00) (99% - 100%)

## 2017-12-19 NOTE — PROGRESS NOTE BEHAVIORAL HEALTH - RISK ASSESSMENT
Not at current risk to self or others, but given recent hx of suicide attempt, disconnected from outpatient treatment, fam hx suicide, acute stressors, unemployment status, substance abuse hx, and inability to engage in safety planning, patient would benefit from 1:1 constant observation and inpatient psychiatric admission.

## 2017-12-19 NOTE — PROGRESS NOTE ADULT - PROBLEM SELECTOR PLAN 1
- would start symptom triggered CIWA   - CIWA scores = 0   - MVI, folate, thiamine  - Psych recs reviewed and "at this time pt is at imminent risk of harm and requires inpt psych hospitalization for safety and stabilization. Patient is agreeable to voluntary admission". Awaiting insurance authorization for transfer. For that reason, patient was not able to be discharged yesterday.

## 2017-12-19 NOTE — PROGRESS NOTE BEHAVIORAL HEALTH - NSBHADMITIPOBSFT_PSY_A_CORE
Not at current risk to self or others, but given recent hx of suicide attempt, disconnected from outpatient treatment, fam hx suicide, acute stressors, unemployment status, substance abuse hx, and inability to engage in safety planning, patient would benefit from 1:1 constant observation.

## 2017-12-19 NOTE — PROGRESS NOTE BEHAVIORAL HEALTH - SUMMARY
42yoF,  with reported PPHx of "chronic depression but rule out bipolar d/o", PMDD, post-traumatic stress disorder, and ETOH abuse, 2 prior hospitalizations 1 at [facility] and last one in [place] 1 year ago, one reported suicide attempt at age 16 by slicing wrists, no hx of violence/legal problems, PMhx significant for migraine HA. Patient presented to ED endorsing worsening depressed mood and suicidal ideation in the context of multiple acute stressors and heavy alcohol use. Patient was admitted due to concern for ETOH w/d however pt has been medically stable during hospitalization with no signs/symptoms of withdrawal. Patient reports active suicidal ideation at present with aborted attempt yesterday to slice a major artery with a butterfly knife, and envisions herself dying in a car accident like her ex- did (anniversary of his death is next week).   At this time, pt is at imminent risk of harm and requires inpt psych hospitalization for safety and stabilization. Patient is agreeable to voluntary admission since yesterday. She is awaiting insurance authorization for transfer, hence, patient was unable to be discharged yesterday. Patient received Ativan 1mg PO 10:20PM yesterday. Once patient is received at [facility], would recommend contacting Dr. Reyna, 's [place], [place] psychiatrist. Patient had no complaints, was calm and cooperative during evaluation with 1:1 CO at bedside. Denied current SI/HI/AH/VH/paranoia. She felt well-cared for in hospital. Bed secured at [facility] unit 2N and handoff provided to Dr. Zheng Marie.     1-Continue Folate 1mg PO daily, Thiamine 100mg PO daily, Klonopin 0.5mg PO QHS, Lithium 300mg PO BID, Topomax 50mg PO BID, Trazodone 50mg PO QHS, Ativan taper 2mg IV PRN per CIWA  2- continue to hold Prozac 20mg PO daily  3- once medically clear and insurance authorized, ready for [facility] transfer to 2N unit; handoff completed with Dr. Marie. Voluntary admission paperwork in chart.

## 2017-12-19 NOTE — PROGRESS NOTE ADULT - PROBLEM SELECTOR PLAN 3
- continue with home medications for now  - c/w trazodone to 50 mg - it was recently increased to 100 mg and pt feels she has been having more trouble sleeping since the increase

## 2017-12-19 NOTE — PROGRESS NOTE ADULT - PROBLEM SELECTOR PLAN 2
- continue with 1:1 for suicidal ideations  - psychiatry reviewed and awaiting inpatient [facility] transfer

## 2017-12-19 NOTE — PROGRESS NOTE BEHAVIORAL HEALTH - NSBHCHARTREVIEWLAB_PSY_A_CORE FT
10.7   7.78  )-----------( 176      ( 18 Dec 2017 07:31 )             32.3   12-18    142  |  110<H>  |  8   ----------------------------<  77  4.1   |  26  |  0.84    Ca    7.6<L>      18 Dec 2017 07:31  Phos  2.1     12-17  Mg     1.9     12-17    TPro  6.0  /  Alb  3.7  /  TBili  0.2  /  DBili  0.1  /  AST  21  /  ALT  14  /  AlkPhos  64  12-17      Ca 8.0  Mg 1.9  TSH 2.58  UA negative  Utox negative  Li level last night 7pm: 0.46

## 2017-12-20 PROCEDURE — 99232 SBSQ HOSP IP/OBS MODERATE 35: CPT | Mod: GC

## 2017-12-20 PROCEDURE — 99223 1ST HOSP IP/OBS HIGH 75: CPT

## 2017-12-20 RX ADMIN — Medication 0.5 MILLIGRAM(S): at 21:21

## 2017-12-20 RX ADMIN — Medication 50 MILLIGRAM(S): at 21:21

## 2017-12-20 RX ADMIN — Medication 1 TABLET(S): at 09:07

## 2017-12-20 RX ADMIN — Medication 50 MILLIGRAM(S): at 09:07

## 2017-12-20 RX ADMIN — Medication 0.5 MILLIGRAM(S): at 09:07

## 2017-12-20 RX ADMIN — Medication 100 MILLIGRAM(S): at 09:07

## 2017-12-20 RX ADMIN — Medication 1 MILLIGRAM(S): at 09:07

## 2017-12-20 RX ADMIN — LITHIUM CARBONATE 300 MILLIGRAM(S): 300 TABLET, EXTENDED RELEASE ORAL at 09:07

## 2017-12-20 NOTE — CONSULT NOTE ADULT - ASSESSMENT
42F binge drinking with hallucinosis, depression    EtOH abuse: Not a daily drinker, no evidence dependence/withdrawal.  May d/c thiamine/folate.    Depression with psychosis: management per primary team. 42F binge drinking with hallucinosis, depression    EtOH abuse: Not a daily drinker, no evidence dependence/withdrawal.  May d/c thiamine/folate.    Cold sore: has lip balm, antivirals will not affect course at this time as lesion has crusted.    Depression with psychosis: management per primary team.

## 2017-12-20 NOTE — CONSULT NOTE ADULT - SUBJECTIVE AND OBJECTIVE BOX
HPI: Pt is 42F admitted to [facility] 17 with hallucinosis in the context of binge drinking.  She was monitored on symptom triggered CIWA and remained stable, transferred to [facility] 17 for management of depression with psychotic features.    PAST MEDICAL & SURGICAL HISTORY:  Alcohol abuse  Severe episode of recurrent major depressive disorder, with psychotic features  Anxiety  S/P cholecystectomy  Delivered by  section  S/P breast augmentation      Review of Systems:   CONSTITUTIONAL: No fever, weight loss, or fatigue  EYES: No eye pain, visual disturbances, or discharge  ENMT:  No difficulty hearing, tinnitus, vertigo; No sinus or throat pain  NECK: No pain or stiffness  RESPIRATORY: No cough, wheezing, chills or hemoptysis; No shortness of breath  CARDIOVASCULAR: No chest pain, palpitations, dizziness, or leg swelling  GASTROINTESTINAL: No abdominal or epigastric pain. No nausea, vomiting, or hematemesis; No diarrhea or constipation. No melena or hematochezia.  GENITOURINARY: No dysuria, frequency, hematuria, or incontinence  NEUROLOGICAL: No headaches, memory loss, loss of strength, numbness, or tremors  SKIN: No itching, burning, rashes, or lesions   LYMPH NODES: No enlarged glands  ENDOCRINE: No heat or cold intolerance; No hair loss  MUSCULOSKELETAL: No joint pain or swelling; No muscle, back, or extremity pain  HEME/LYMPH: No easy bruising, or bleeding gums  ALLERY AND IMMUNOLOGIC: No hives or eczema    Allergies    Percocet 10/325 (Flushing (Skin); Faint)    Social History: binge drinking, not daily, former smoker, denies idu, occ MJ    FAMILY HISTORY:  Family history of bipolar disorder (Sibling)      MEDICATIONS  (STANDING):  clonazePAM Tablet 0.5 milliGRAM(s) Oral two times a day  folic acid 1 milliGRAM(s) Oral daily  lithium 300 milliGRAM(s) Oral two times a day  multivitamin 1 Tablet(s) Oral daily  thiamine 100 milliGRAM(s) Oral daily  topiramate 50 milliGRAM(s) Oral two times a day  traZODone 50 milliGRAM(s) Oral at bedtime    MEDICATIONS  (PRN):  LORazepam     Tablet 1 milliGRAM(s) Oral every 6 hours PRN Anxiety  LORazepam   Injectable 1 milliGRAM(s) IntraMuscular once PRN severe agitation      Vital Signs Last 24 Hrs  T(C): 36.9 (20 Dec 2017 06:32), Max: 36.9 (19 Dec 2017 17:38)  T(F): 98.5 (20 Dec 2017 06:32), Max: 98.5 (19 Dec 2017 17:38)  HR: 76 (19 Dec 2017 13:00) (76 - 76)  BP: 102/69 (19 Dec 2017 13:00) (102/69 - 102/69)  BP(mean): --  RR: 18 (20 Dec 2017 06:32) (16 - 18)  SpO2: 99% (19 Dec 2017 13:00) (99% - 99%)  CAPILLARY BLOOD GLUCOSE            PHYSICAL EXAM:  GENERAL: NAD, well-developed  HEAD:  Atraumatic, Normocephalic  EYES: EOMI, PERRLA, conjunctiva and sclera clear  NECK: Supple, No JVD  CHEST/LUNG: Clear to auscultation bilaterally; No wheeze  HEART: Regular rate and rhythm; No murmurs, rubs, or gallops  ABDOMEN: Soft, Nontender, Nondistended; Bowel sounds present  EXTREMITIES:  2+ Peripheral Pulses, No clubbing, cyanosis, or edema  PSYCH: AAOx3  NEUROLOGY: non-focal  SKIN: No rashes or lesions    LABS:  reviewed in sunrise, unremarkable        EKG(personally reviewed):  Consultant(s) Notes Reviewed:  psych HPI: Pt is 42F admitted to [facility] 17 with hallucinosis in the context of binge drinking.  She was monitored on symptom triggered CIWA and remained stable, transferred to [facility] 17 for management of depression with psychotic features. Denies tremulousness, nausea, abd pain at this time.  Has cold sore on upper lip (gets them periodically).    PAST MEDICAL & SURGICAL HISTORY:  Alcohol abuse  Severe episode of recurrent major depressive disorder, with psychotic features  Anxiety  S/P cholecystectomy  Delivered by  section  S/P breast augmentation      Review of Systems:   CONSTITUTIONAL: No fever, weight loss, or fatigue  EYES: No eye pain, visual disturbances, or discharge  ENMT:  No difficulty hearing, tinnitus, vertigo; No sinus or throat pain  NECK: No pain or stiffness  RESPIRATORY: No cough, wheezing, chills or hemoptysis; No shortness of breath  CARDIOVASCULAR: No chest pain, palpitations, dizziness, or leg swelling  GASTROINTESTINAL: No abdominal or epigastric pain. No nausea, vomiting, or hematemesis; No diarrhea or constipation. No melena or hematochezia.  GENITOURINARY: No dysuria, frequency, hematuria, or incontinence  NEUROLOGICAL: No headaches, memory loss, loss of strength, numbness, or tremors  SKIN: see HPI  LYMPH NODES: No enlarged glands  ENDOCRINE: No heat or cold intolerance; No hair loss  MUSCULOSKELETAL: No joint pain or swelling; No muscle, back, or extremity pain  HEME/LYMPH: No easy bruising, or bleeding gums  ALLERY AND IMMUNOLOGIC: No hives or eczema    Allergies    Percocet 10/325 (Flushing (Skin); Faint)    Social History: binge drinking, not daily, former smoker, denies idu, occ MJ    FAMILY HISTORY:  Family history of bipolar disorder (Sibling)      MEDICATIONS  (STANDING):  clonazePAM Tablet 0.5 milliGRAM(s) Oral two times a day  folic acid 1 milliGRAM(s) Oral daily  lithium 300 milliGRAM(s) Oral two times a day  multivitamin 1 Tablet(s) Oral daily  thiamine 100 milliGRAM(s) Oral daily  topiramate 50 milliGRAM(s) Oral two times a day  traZODone 50 milliGRAM(s) Oral at bedtime    MEDICATIONS  (PRN):  LORazepam     Tablet 1 milliGRAM(s) Oral every 6 hours PRN Anxiety  LORazepam   Injectable 1 milliGRAM(s) IntraMuscular once PRN severe agitation      Vital Signs Last 24 Hrs  T(C): 36.9 (20 Dec 2017 06:32), Max: 36.9 (19 Dec 2017 17:38)  T(F): 98.5 (20 Dec 2017 06:32), Max: 98.5 (19 Dec 2017 17:38)  HR: 76 (19 Dec 2017 13:00) (76 - 76)  BP: 102/69 (19 Dec 2017 13:00) (102/69 - 102/69)  BP(mean): --  RR: 18 (20 Dec 2017 06:32) (16 - 18)  SpO2: 99% (19 Dec 2017 13:00) (99% - 99%)  CAPILLARY BLOOD GLUCOSE            PHYSICAL EXAM:  GENERAL: NAD, well-developed  HEAD:  Atraumatic, Normocephalic  EYES: EOMI, PERRLA, conjunctiva and sclera clear  NECK: Supple, No JVD  CHEST/LUNG: Clear to auscultation bilaterally; No wheeze  HEART: Regular rate and rhythm; No murmurs, rubs, or gallops  ABDOMEN: Soft, Nontender, Nondistended; Bowel sounds present  EXTREMITIES:  2+ Peripheral Pulses, No clubbing, cyanosis, or edema  PSYCH: AAOx3  NEUROLOGY: non-focal  SKIN: cold alex upper lip    LABS:  reviewed in sunrise, unremarkable        Consultant(s) Notes Reviewed:  psych

## 2017-12-21 PROCEDURE — 99233 SBSQ HOSP IP/OBS HIGH 50: CPT

## 2017-12-21 PROCEDURE — 99232 SBSQ HOSP IP/OBS MODERATE 35: CPT | Mod: GC

## 2017-12-21 RX ADMIN — Medication 50 MILLIGRAM(S): at 09:01

## 2017-12-21 RX ADMIN — Medication 0.5 MILLIGRAM(S): at 09:01

## 2017-12-21 RX ADMIN — Medication 1 TABLET(S): at 09:01

## 2017-12-21 RX ADMIN — Medication 0.5 MILLIGRAM(S): at 21:16

## 2017-12-21 RX ADMIN — Medication 1 MILLIGRAM(S): at 09:01

## 2017-12-21 RX ADMIN — Medication 50 MILLIGRAM(S): at 21:16

## 2017-12-21 RX ADMIN — Medication 100 MILLIGRAM(S): at 09:01

## 2017-12-22 VITALS — RESPIRATION RATE: 18 BRPM | TEMPERATURE: 98 F

## 2017-12-22 PROCEDURE — 99239 HOSP IP/OBS DSCHRG MGMT >30: CPT | Mod: GC

## 2017-12-22 PROCEDURE — 99239 HOSP IP/OBS DSCHRG MGMT >30: CPT

## 2017-12-22 RX ORDER — TOPIRAMATE 25 MG
1 TABLET ORAL
Qty: 28 | Refills: 0
Start: 2017-12-22 | End: 2018-01-04

## 2017-12-22 RX ORDER — TRAZODONE HCL 50 MG
1 TABLET ORAL
Qty: 14 | Refills: 0
Start: 2017-12-22 | End: 2018-01-04

## 2017-12-22 RX ORDER — CLONAZEPAM 1 MG
1 TABLET ORAL
Qty: 28 | Refills: 0
Start: 2017-12-22 | End: 2018-01-04

## 2017-12-22 RX ADMIN — Medication 0.5 MILLIGRAM(S): at 09:36

## 2017-12-22 RX ADMIN — Medication 50 MILLIGRAM(S): at 09:37

## 2017-12-22 RX ADMIN — Medication 100 MILLIGRAM(S): at 09:36

## 2017-12-22 RX ADMIN — Medication 1 TABLET(S): at 09:36

## 2017-12-22 RX ADMIN — Medication 1 MILLIGRAM(S): at 09:36

## 2018-01-10 ENCOUNTER — OUTPATIENT (OUTPATIENT)
Dept: OUTPATIENT SERVICES | Facility: HOSPITAL | Age: 43
LOS: 1 days | Discharge: HOME | End: 2018-01-10

## 2018-01-10 DIAGNOSIS — Z90.49 ACQUIRED ABSENCE OF OTHER SPECIFIED PARTS OF DIGESTIVE TRACT: Chronic | ICD-10-CM

## 2018-01-10 DIAGNOSIS — Z98.82 BREAST IMPLANT STATUS: Chronic | ICD-10-CM

## 2018-01-11 DIAGNOSIS — F39 UNSPECIFIED MOOD [AFFECTIVE] DISORDER: ICD-10-CM

## 2018-02-09 ENCOUNTER — OUTPATIENT (OUTPATIENT)
Dept: OUTPATIENT SERVICES | Facility: HOSPITAL | Age: 43
LOS: 1 days | Discharge: NURSING, REHAB OR LONG-TERM CARE | End: 2018-02-09

## 2018-02-09 DIAGNOSIS — Z98.82 BREAST IMPLANT STATUS: Chronic | ICD-10-CM

## 2018-02-09 DIAGNOSIS — Z90.49 ACQUIRED ABSENCE OF OTHER SPECIFIED PARTS OF DIGESTIVE TRACT: Chronic | ICD-10-CM

## 2018-02-12 DIAGNOSIS — F31.81 BIPOLAR II DISORDER: ICD-10-CM

## 2018-02-12 LAB
ALBUMIN SERPL ELPH-MCNC: 4.2 G/DL — SIGNIFICANT CHANGE UP (ref 3.3–5)
ALP SERPL-CCNC: 66 U/L — SIGNIFICANT CHANGE UP (ref 40–120)
ALT FLD-CCNC: 12 U/L — SIGNIFICANT CHANGE UP (ref 4–33)
AMPHET UR-MCNC: SIGNIFICANT CHANGE UP
APPEARANCE UR: SIGNIFICANT CHANGE UP
AST SERPL-CCNC: 14 U/L — SIGNIFICANT CHANGE UP (ref 4–32)
BARBITURATES UR SCN-MCNC: NEGATIVE — SIGNIFICANT CHANGE UP
BASOPHILS # BLD AUTO: 0.04 K/UL — SIGNIFICANT CHANGE UP (ref 0–0.2)
BASOPHILS NFR BLD AUTO: 0.5 % — SIGNIFICANT CHANGE UP (ref 0–2)
BENZODIAZ UR-MCNC: NEGATIVE — SIGNIFICANT CHANGE UP
BILIRUB SERPL-MCNC: 0.3 MG/DL — SIGNIFICANT CHANGE UP (ref 0.2–1.2)
BILIRUB UR-MCNC: NEGATIVE — SIGNIFICANT CHANGE UP
BLOOD UR QL VISUAL: NEGATIVE — SIGNIFICANT CHANGE UP
BUN SERPL-MCNC: 12 MG/DL — SIGNIFICANT CHANGE UP (ref 7–23)
CALCIUM SERPL-MCNC: 8.4 MG/DL — SIGNIFICANT CHANGE UP (ref 8.4–10.5)
CANNABINOIDS UR-MCNC: NEGATIVE — SIGNIFICANT CHANGE UP
CHLORIDE SERPL-SCNC: 102 MMOL/L — SIGNIFICANT CHANGE UP (ref 98–107)
CHOLEST SERPL-MCNC: 160 MG/DL — SIGNIFICANT CHANGE UP (ref 120–199)
CO2 SERPL-SCNC: 25 MMOL/L — SIGNIFICANT CHANGE UP (ref 22–31)
COCAINE METAB.OTHER UR-MCNC: NEGATIVE — SIGNIFICANT CHANGE UP
COLOR SPEC: YELLOW — SIGNIFICANT CHANGE UP
CREAT SERPL-MCNC: 0.9 MG/DL — SIGNIFICANT CHANGE UP (ref 0.5–1.3)
EOSINOPHIL # BLD AUTO: 0.41 K/UL — SIGNIFICANT CHANGE UP (ref 0–0.5)
EOSINOPHIL NFR BLD AUTO: 4.9 % — SIGNIFICANT CHANGE UP (ref 0–6)
GLUCOSE SERPL-MCNC: 89 MG/DL — SIGNIFICANT CHANGE UP (ref 70–99)
GLUCOSE UR-MCNC: NEGATIVE — SIGNIFICANT CHANGE UP
HBA1C BLD-MCNC: 5.3 % — SIGNIFICANT CHANGE UP (ref 4–5.6)
HCT VFR BLD CALC: 38.2 % — SIGNIFICANT CHANGE UP (ref 34.5–45)
HDLC SERPL-MCNC: 85 MG/DL — HIGH (ref 45–65)
HGB BLD-MCNC: 12 G/DL — SIGNIFICANT CHANGE UP (ref 11.5–15.5)
IMM GRANULOCYTES # BLD AUTO: 0.03 # — SIGNIFICANT CHANGE UP
IMM GRANULOCYTES NFR BLD AUTO: 0.4 % — SIGNIFICANT CHANGE UP (ref 0–1.5)
KETONES UR-MCNC: NEGATIVE — SIGNIFICANT CHANGE UP
LEUKOCYTE ESTERASE UR-ACNC: NEGATIVE — SIGNIFICANT CHANGE UP
LIPID PNL WITH DIRECT LDL SERPL: 75 MG/DL — SIGNIFICANT CHANGE UP
LITHIUM SERPL-MCNC: 0.72 MMOL/L — SIGNIFICANT CHANGE UP (ref 0.6–1.2)
LYMPHOCYTES # BLD AUTO: 1.36 K/UL — SIGNIFICANT CHANGE UP (ref 1–3.3)
LYMPHOCYTES # BLD AUTO: 16.1 % — SIGNIFICANT CHANGE UP (ref 13–44)
MCHC RBC-ENTMCNC: 29.6 PG — SIGNIFICANT CHANGE UP (ref 27–34)
MCHC RBC-ENTMCNC: 31.4 % — LOW (ref 32–36)
MCV RBC AUTO: 94.1 FL — SIGNIFICANT CHANGE UP (ref 80–100)
METHADONE UR-MCNC: NEGATIVE — SIGNIFICANT CHANGE UP
MONOCYTES # BLD AUTO: 0.47 K/UL — SIGNIFICANT CHANGE UP (ref 0–0.9)
MONOCYTES NFR BLD AUTO: 5.6 % — SIGNIFICANT CHANGE UP (ref 2–14)
MUCOUS THREADS # UR AUTO: SIGNIFICANT CHANGE UP
NEUTROPHILS # BLD AUTO: 6.12 K/UL — SIGNIFICANT CHANGE UP (ref 1.8–7.4)
NEUTROPHILS NFR BLD AUTO: 72.5 % — SIGNIFICANT CHANGE UP (ref 43–77)
NITRITE UR-MCNC: NEGATIVE — SIGNIFICANT CHANGE UP
NRBC # FLD: 0 — SIGNIFICANT CHANGE UP
OPIATES UR-MCNC: NEGATIVE — SIGNIFICANT CHANGE UP
OXYCODONE UR-MCNC: NEGATIVE — SIGNIFICANT CHANGE UP
PCP UR-MCNC: NEGATIVE — SIGNIFICANT CHANGE UP
PH UR: 6.5 — SIGNIFICANT CHANGE UP (ref 4.6–8)
PLATELET # BLD AUTO: 225 K/UL — SIGNIFICANT CHANGE UP (ref 150–400)
PMV BLD: 8.9 FL — SIGNIFICANT CHANGE UP (ref 7–13)
POTASSIUM SERPL-MCNC: 4.4 MMOL/L — SIGNIFICANT CHANGE UP (ref 3.5–5.3)
POTASSIUM SERPL-SCNC: 4.4 MMOL/L — SIGNIFICANT CHANGE UP (ref 3.5–5.3)
PROT SERPL-MCNC: 6.7 G/DL — SIGNIFICANT CHANGE UP (ref 6–8.3)
PROT UR-MCNC: NEGATIVE MG/DL — SIGNIFICANT CHANGE UP
RBC # BLD: 4.06 M/UL — SIGNIFICANT CHANGE UP (ref 3.8–5.2)
RBC # FLD: 12.7 % — SIGNIFICANT CHANGE UP (ref 10.3–14.5)
RBC CASTS # UR COMP ASSIST: SIGNIFICANT CHANGE UP (ref 0–?)
SODIUM SERPL-SCNC: 138 MMOL/L — SIGNIFICANT CHANGE UP (ref 135–145)
SP GR SPEC: 1.02 — SIGNIFICANT CHANGE UP (ref 1–1.04)
SQUAMOUS # UR AUTO: SIGNIFICANT CHANGE UP
TRIGL SERPL-MCNC: 54 MG/DL — SIGNIFICANT CHANGE UP (ref 10–149)
TSH SERPL-MCNC: 2.14 UIU/ML — SIGNIFICANT CHANGE UP (ref 0.27–4.2)
UROBILINOGEN FLD QL: NORMAL MG/DL — SIGNIFICANT CHANGE UP
WBC # BLD: 8.43 K/UL — SIGNIFICANT CHANGE UP (ref 3.8–10.5)
WBC # FLD AUTO: 8.43 K/UL — SIGNIFICANT CHANGE UP (ref 3.8–10.5)
WBC UR QL: SIGNIFICANT CHANGE UP (ref 0–?)

## 2018-02-20 LAB — LITHIUM SERPL-MCNC: 0.77 MMOL/L — SIGNIFICANT CHANGE UP (ref 0.6–1.2)

## 2018-03-19 ENCOUNTER — INPATIENT (INPATIENT)
Facility: HOSPITAL | Age: 43
LOS: 6 days | Discharge: HOME | End: 2018-03-26
Attending: PSYCHIATRY & NEUROLOGY | Admitting: PSYCHIATRY & NEUROLOGY
Payer: MEDICAID

## 2018-03-19 DIAGNOSIS — Z98.82 BREAST IMPLANT STATUS: Chronic | ICD-10-CM

## 2018-03-19 DIAGNOSIS — Z90.49 ACQUIRED ABSENCE OF OTHER SPECIFIED PARTS OF DIGESTIVE TRACT: Chronic | ICD-10-CM

## 2018-03-19 DIAGNOSIS — F31.81 BIPOLAR II DISORDER: ICD-10-CM

## 2018-03-19 PROCEDURE — 90792 PSYCH DIAG EVAL W/MED SRVCS: CPT

## 2018-03-19 RX ORDER — NICOTINE POLACRILEX 2 MG
1 GUM BUCCAL
Refills: 0 | Status: DISCONTINUED | OUTPATIENT
Start: 2018-03-19 | End: 2018-03-26

## 2018-03-19 RX ORDER — RISPERIDONE 4 MG/1
0.5 TABLET ORAL
Refills: 0 | Status: DISCONTINUED | OUTPATIENT
Start: 2018-03-19 | End: 2018-03-26

## 2018-03-19 RX ORDER — TRAZODONE HCL 50 MG
100 TABLET ORAL AT BEDTIME
Refills: 0 | Status: DISCONTINUED | OUTPATIENT
Start: 2018-03-19 | End: 2018-03-26

## 2018-03-19 RX ORDER — LITHIUM CARBONATE 300 MG/1
300 TABLET, EXTENDED RELEASE ORAL DAILY
Refills: 0 | Status: DISCONTINUED | OUTPATIENT
Start: 2018-03-20 | End: 2018-03-22

## 2018-03-19 RX ORDER — HALOPERIDOL DECANOATE 100 MG/ML
5 INJECTION INTRAMUSCULAR EVERY 6 HOURS
Refills: 0 | Status: DISCONTINUED | OUTPATIENT
Start: 2018-03-19 | End: 2018-03-19

## 2018-03-19 RX ORDER — LITHIUM CARBONATE 300 MG/1
600 TABLET, EXTENDED RELEASE ORAL AT BEDTIME
Refills: 0 | Status: DISCONTINUED | OUTPATIENT
Start: 2018-03-19 | End: 2018-03-22

## 2018-03-19 RX ORDER — HALOPERIDOL DECANOATE 100 MG/ML
5 INJECTION INTRAMUSCULAR EVERY 6 HOURS
Refills: 0 | Status: DISCONTINUED | OUTPATIENT
Start: 2018-03-19 | End: 2018-03-26

## 2018-03-19 RX ORDER — CLONAZEPAM 1 MG
0.5 TABLET ORAL
Refills: 0 | Status: COMPLETED | OUTPATIENT
Start: 2018-03-19 | End: 2018-03-26

## 2018-03-19 RX ADMIN — Medication 1 EACH: at 16:53

## 2018-03-19 RX ADMIN — LITHIUM CARBONATE 600 MILLIGRAM(S): 300 TABLET, EXTENDED RELEASE ORAL at 21:23

## 2018-03-19 RX ADMIN — Medication 0.5 MILLIGRAM(S): at 16:53

## 2018-03-19 RX ADMIN — RISPERIDONE 0.5 MILLIGRAM(S): 4 TABLET ORAL at 21:23

## 2018-03-19 RX ADMIN — Medication 100 MILLIGRAM(S): at 21:23

## 2018-03-19 NOTE — CHART NOTE - NSCHARTNOTEFT_GEN_A_CORE
Screening Medical Evaluation  Patient Admitted from: [facility] admitting diagnosis: Bipolar II disorder    PAST MEDICAL & SURGICAL HISTORY:  Migraine  Alcohol abuse  Severe episode of recurrent major depressive disorder, with psychotic features  Anxiety  S/P cholecystectomy  Delivered by  section  S/P breast augmentation        Allergies    Percocet 10/325 (Flushing (Skin); Faint)    Intolerances        Social History:     FAMILY HISTORY:  Family history of bipolar disorder (Sibling)      MEDICATIONS  (STANDING):  clonazePAM Tablet 0.5 milliGRAM(s) Oral two times a day  lithium 600 milliGRAM(s) Oral at bedtime  risperiDONE   Tablet 0.5 milliGRAM(s) Oral two times a day  traZODone 100 milliGRAM(s) Oral at bedtime    MEDICATIONS  (PRN):  haloperidol     Tablet 5 milliGRAM(s) Oral every 6 hours PRN Agitation  LORazepam     Tablet 2 milliGRAM(s) Oral every 6 hours PRN Agitation  nicotine - Inhaler 1 Each Inhalation every 2 hours PRN Nicotine Withdrawal      Vital Signs Last 24 Hrs  T(C): --  T(F): --  HR: --  BP: --  BP(mean): --  RR: --  SpO2: --  CAPILLARY BLOOD GLUCOSE            PHYSICAL EXAM:  GENERAL: NAD, well-developed  HEAD:  Atraumatic, Normocephalic  EYES: EOMI, PERRLA, conjunctiva and sclera clear  NECK: Supple, No JVD  CHEST/LUNG: Clear to auscultation bilaterally; No wheeze  HEART: Regular rate and rhythm; No murmurs, rubs, or gallops  ABDOMEN: Soft, Nontender, Nondistended; Bowel sounds present  EXTREMITIES:  2+ Peripheral Pulses, No clubbing, cyanosis, or edema  PSYCH: AAOx3  NEUROLOGY: non-focal  SKIN:     LABS:                    RADIOLOGY & ADDITIONAL TESTS:    Assessment and Plan: 43 yo F with PMH of migraine and alcohol abuse is admitted to [facility] with a primary psychiatric diagnosis of Bipolar II disorder. The pt currently denies having medical complains such as chest pain, sob, abdominal pain, headaches, n/v/d/c, or any problems with urination or bowel movements. The rest of her screening physical is unremarkable.    1.Bipolar II disorder-Plan: continue with meds as per primary psychiatric team Screening Medical Evaluation  Patient Admitted from: [facility] admitting diagnosis: Bipolar II disorder    PAST MEDICAL & SURGICAL HISTORY:  Migraine  Alcohol abuse  Severe episode of recurrent major depressive disorder, with psychotic features  Anxiety  S/P cholecystectomy  Delivered by  section  S/P breast augmentation        Allergies    Percocet 10/325 (Flushing (Skin); Faint)    Intolerances        Social History:     FAMILY HISTORY:  Family history of bipolar disorder (Sibling)      MEDICATIONS  (STANDING):  clonazePAM Tablet 0.5 milliGRAM(s) Oral two times a day  lithium 600 milliGRAM(s) Oral at bedtime  risperiDONE   Tablet 0.5 milliGRAM(s) Oral two times a day  traZODone 100 milliGRAM(s) Oral at bedtime    MEDICATIONS  (PRN):  haloperidol     Tablet 5 milliGRAM(s) Oral every 6 hours PRN Agitation  LORazepam     Tablet 2 milliGRAM(s) Oral every 6 hours PRN Agitation  nicotine - Inhaler 1 Each Inhalation every 2 hours PRN Nicotine Withdrawal      Vital Signs Last 24 Hrs  T(C): --  T(F): --  HR: --  BP: --  BP(mean): --  RR: --  SpO2: --  CAPILLARY BLOOD GLUCOSE            PHYSICAL EXAM:  GENERAL: NAD, well-developed  HEAD:  Atraumatic, Normocephalic  EYES: EOMI, PERRLA, conjunctiva and sclera clear  NECK: Supple, No JVD  CHEST/LUNG: Clear to auscultation bilaterally; No wheeze  HEART: Regular rate and rhythm; No murmurs, rubs, or gallops  ABDOMEN: Soft, Nontender, Nondistended; Bowel sounds present  EXTREMITIES:  2+ Peripheral Pulses, No clubbing, cyanosis, or edema  PSYCH: AAOx3  NEUROLOGY: non-focal  SKIN: In tact    LABS:                    RADIOLOGY & ADDITIONAL TESTS:    Assessment and Plan: 43 yo F with PMH of migraine and alcohol abuse is admitted to [facility] with a primary psychiatric diagnosis of Bipolar II disorder. The pt currently denies having medical complains such as chest pain, sob, abdominal pain, headaches, n/v/d/c, or any problems with urination or bowel movements. The rest of her screening physical is unremarkable.    1.Bipolar II disorder-Plan: continue with meds as per primary psychiatric team Screening Medical Evaluation  Patient Admitted from: [facility] admitting diagnosis: Bipolar II disorder    PAST MEDICAL & SURGICAL HISTORY:  Migraine  Alcohol abuse  Severe episode of recurrent major depressive disorder, with psychotic features  Anxiety  S/P cholecystectomy  Delivered by  section  S/P breast augmentation        Allergies    Percocet 10/325 (Flushing (Skin); Faint)    Intolerances        Social History:     FAMILY HISTORY:  Family history of bipolar disorder (Sibling)      MEDICATIONS  (STANDING):  clonazePAM Tablet 0.5 milliGRAM(s) Oral two times a day  lithium 600 milliGRAM(s) Oral at bedtime  risperiDONE   Tablet 0.5 milliGRAM(s) Oral two times a day  traZODone 100 milliGRAM(s) Oral at bedtime    MEDICATIONS  (PRN):  haloperidol     Tablet 5 milliGRAM(s) Oral every 6 hours PRN Agitation  LORazepam     Tablet 2 milliGRAM(s) Oral every 6 hours PRN Agitation  nicotine - Inhaler 1 Each Inhalation every 2 hours PRN Nicotine Withdrawal      Vital Signs Last 24 Hrs  T(C): --  T(F): --  HR: --67  BP: --101/60  BP(mean): --  RR: --  SpO2: --  CAPILLARY BLOOD GLUCOSE            PHYSICAL EXAM:  GENERAL: NAD, well-developed  HEAD:  Atraumatic, Normocephalic  EYES: EOMI, PERRLA, conjunctiva and sclera clear  NECK: Supple, No JVD  CHEST/LUNG: Clear to auscultation bilaterally; No wheeze, rales, or rhonchi  HEART: Regular rate and rhythm; No murmurs, rubs, or gallops  ABDOMEN: Soft, Nontender, Nondistended; Bowel sounds present  EXTREMITIES:  2+ Peripheral Pulses, No clubbing, cyanosis, or edema  PSYCH: AAOx3  NEUROLOGY: non-focal  SKIN: In tact    LABS:                    RADIOLOGY & ADDITIONAL TESTS:    Assessment and Plan: 41 yo F with PMH of migraine and alcohol abuse is admitted to [facility] with a primary psychiatric diagnosis of Bipolar II disorder. The pt currently denies having medical complains such as chest pain, sob, abdominal pain, headaches, n/v/d/c, or any problems with urination or bowel movements. The rest of her screening physical is unremarkable.    1.Bipolar II disorder-Plan: continue with meds as per primary psychiatric team

## 2018-03-20 VITALS — HEIGHT: 60 IN | RESPIRATION RATE: 17 BRPM | TEMPERATURE: 98 F | WEIGHT: 113.98 LBS

## 2018-03-20 LAB
BASOPHILS # BLD AUTO: 0.04 K/UL — SIGNIFICANT CHANGE UP (ref 0–0.2)
BASOPHILS NFR BLD AUTO: 0.5 % — SIGNIFICANT CHANGE UP (ref 0–2)
CHOLEST SERPL-MCNC: 114 MG/DL — LOW (ref 120–199)
EOSINOPHIL # BLD AUTO: 0.35 K/UL — SIGNIFICANT CHANGE UP (ref 0–0.5)
EOSINOPHIL NFR BLD AUTO: 4.3 % — SIGNIFICANT CHANGE UP (ref 0–6)
HCG SERPL-ACNC: < 5 MIU/ML — SIGNIFICANT CHANGE UP
HCT VFR BLD CALC: 37.5 % — SIGNIFICANT CHANGE UP (ref 34.5–45)
HDLC SERPL-MCNC: 50 MG/DL — SIGNIFICANT CHANGE UP (ref 45–65)
HGB BLD-MCNC: 12.1 G/DL — SIGNIFICANT CHANGE UP (ref 11.5–15.5)
IMM GRANULOCYTES # BLD AUTO: 0.02 # — SIGNIFICANT CHANGE UP
IMM GRANULOCYTES NFR BLD AUTO: 0.2 % — SIGNIFICANT CHANGE UP (ref 0–1.5)
LIPID PNL WITH DIRECT LDL SERPL: 56 MG/DL — SIGNIFICANT CHANGE UP
LITHIUM SERPL-MCNC: 1.11 MMOL/L — SIGNIFICANT CHANGE UP (ref 0.6–1.2)
LYMPHOCYTES # BLD AUTO: 1.5 K/UL — SIGNIFICANT CHANGE UP (ref 1–3.3)
LYMPHOCYTES # BLD AUTO: 18.6 % — SIGNIFICANT CHANGE UP (ref 13–44)
MCHC RBC-ENTMCNC: 29.2 PG — SIGNIFICANT CHANGE UP (ref 27–34)
MCHC RBC-ENTMCNC: 32.3 % — SIGNIFICANT CHANGE UP (ref 32–36)
MCV RBC AUTO: 90.6 FL — SIGNIFICANT CHANGE UP (ref 80–100)
MONOCYTES # BLD AUTO: 0.49 K/UL — SIGNIFICANT CHANGE UP (ref 0–0.9)
MONOCYTES NFR BLD AUTO: 6.1 % — SIGNIFICANT CHANGE UP (ref 2–14)
NEUTROPHILS # BLD AUTO: 5.66 K/UL — SIGNIFICANT CHANGE UP (ref 1.8–7.4)
NEUTROPHILS NFR BLD AUTO: 70.3 % — SIGNIFICANT CHANGE UP (ref 43–77)
NRBC # FLD: 0 — SIGNIFICANT CHANGE UP
PLATELET # BLD AUTO: 203 K/UL — SIGNIFICANT CHANGE UP (ref 150–400)
PMV BLD: 9.6 FL — SIGNIFICANT CHANGE UP (ref 7–13)
RBC # BLD: 4.14 M/UL — SIGNIFICANT CHANGE UP (ref 3.8–5.2)
RBC # FLD: 12.6 % — SIGNIFICANT CHANGE UP (ref 10.3–14.5)
TRIGL SERPL-MCNC: 67 MG/DL — SIGNIFICANT CHANGE UP (ref 10–149)
TSH SERPL-MCNC: 0.62 UIU/ML — SIGNIFICANT CHANGE UP (ref 0.27–4.2)
WBC # BLD: 8.06 K/UL — SIGNIFICANT CHANGE UP (ref 3.8–10.5)
WBC # FLD AUTO: 8.06 K/UL — SIGNIFICANT CHANGE UP (ref 3.8–10.5)

## 2018-03-20 PROCEDURE — 90832 PSYTX W PT 30 MINUTES: CPT

## 2018-03-20 PROCEDURE — 99232 SBSQ HOSP IP/OBS MODERATE 35: CPT

## 2018-03-20 RX ADMIN — RISPERIDONE 0.5 MILLIGRAM(S): 4 TABLET ORAL at 09:03

## 2018-03-20 RX ADMIN — LITHIUM CARBONATE 600 MILLIGRAM(S): 300 TABLET, EXTENDED RELEASE ORAL at 20:31

## 2018-03-20 RX ADMIN — Medication 0.5 MILLIGRAM(S): at 20:31

## 2018-03-20 RX ADMIN — LITHIUM CARBONATE 300 MILLIGRAM(S): 300 TABLET, EXTENDED RELEASE ORAL at 09:03

## 2018-03-20 RX ADMIN — Medication 100 MILLIGRAM(S): at 20:31

## 2018-03-20 RX ADMIN — Medication 0.5 MILLIGRAM(S): at 09:02

## 2018-03-20 RX ADMIN — RISPERIDONE 0.5 MILLIGRAM(S): 4 TABLET ORAL at 20:31

## 2018-03-20 RX ADMIN — Medication 1 EACH: at 14:03

## 2018-03-21 PROCEDURE — 99232 SBSQ HOSP IP/OBS MODERATE 35: CPT | Mod: 25

## 2018-03-21 PROCEDURE — 90853 GROUP PSYCHOTHERAPY: CPT

## 2018-03-21 RX ADMIN — Medication 0.5 MILLIGRAM(S): at 21:40

## 2018-03-21 RX ADMIN — LITHIUM CARBONATE 600 MILLIGRAM(S): 300 TABLET, EXTENDED RELEASE ORAL at 21:40

## 2018-03-21 RX ADMIN — Medication 0.5 MILLIGRAM(S): at 08:40

## 2018-03-21 RX ADMIN — RISPERIDONE 0.5 MILLIGRAM(S): 4 TABLET ORAL at 21:40

## 2018-03-21 RX ADMIN — LITHIUM CARBONATE 300 MILLIGRAM(S): 300 TABLET, EXTENDED RELEASE ORAL at 08:40

## 2018-03-21 RX ADMIN — Medication 100 MILLIGRAM(S): at 21:40

## 2018-03-21 RX ADMIN — Medication 1 EACH: at 12:38

## 2018-03-21 RX ADMIN — HALOPERIDOL DECANOATE 5 MILLIGRAM(S): 100 INJECTION INTRAMUSCULAR at 16:14

## 2018-03-21 RX ADMIN — Medication 2 MILLIGRAM(S): at 10:22

## 2018-03-21 RX ADMIN — RISPERIDONE 0.5 MILLIGRAM(S): 4 TABLET ORAL at 08:40

## 2018-03-22 LAB
AMPHET UR-MCNC: NEGATIVE — SIGNIFICANT CHANGE UP
BARBITURATES UR SCN-MCNC: NEGATIVE — SIGNIFICANT CHANGE UP
BENZODIAZ UR-MCNC: NEGATIVE — SIGNIFICANT CHANGE UP
BUN SERPL-MCNC: 15 MG/DL — SIGNIFICANT CHANGE UP (ref 7–23)
CALCIUM SERPL-MCNC: 8.7 MG/DL — SIGNIFICANT CHANGE UP (ref 8.4–10.5)
CANNABINOIDS UR-MCNC: NEGATIVE — SIGNIFICANT CHANGE UP
CHLORIDE SERPL-SCNC: 107 MMOL/L — SIGNIFICANT CHANGE UP (ref 98–107)
CO2 SERPL-SCNC: 23 MMOL/L — SIGNIFICANT CHANGE UP (ref 22–31)
COCAINE METAB.OTHER UR-MCNC: NEGATIVE — SIGNIFICANT CHANGE UP
CREAT SERPL-MCNC: 0.97 MG/DL — SIGNIFICANT CHANGE UP (ref 0.5–1.3)
GLUCOSE SERPL-MCNC: 96 MG/DL — SIGNIFICANT CHANGE UP (ref 70–99)
LITHIUM SERPL-MCNC: 1.24 MMOL/L — HIGH (ref 0.6–1.2)
METHADONE UR-MCNC: NEGATIVE — SIGNIFICANT CHANGE UP
OPIATES UR-MCNC: NEGATIVE — SIGNIFICANT CHANGE UP
OXYCODONE UR-MCNC: NEGATIVE — SIGNIFICANT CHANGE UP
PCP UR-MCNC: NEGATIVE — SIGNIFICANT CHANGE UP
POTASSIUM SERPL-MCNC: 4.5 MMOL/L — SIGNIFICANT CHANGE UP (ref 3.5–5.3)
POTASSIUM SERPL-SCNC: 4.5 MMOL/L — SIGNIFICANT CHANGE UP (ref 3.5–5.3)
SODIUM SERPL-SCNC: 140 MMOL/L — SIGNIFICANT CHANGE UP (ref 135–145)

## 2018-03-22 PROCEDURE — 99232 SBSQ HOSP IP/OBS MODERATE 35: CPT

## 2018-03-22 PROCEDURE — 90832 PSYTX W PT 30 MINUTES: CPT

## 2018-03-22 RX ORDER — LITHIUM CARBONATE 300 MG/1
300 TABLET, EXTENDED RELEASE ORAL
Refills: 0 | Status: DISCONTINUED | OUTPATIENT
Start: 2018-03-23 | End: 2018-03-26

## 2018-03-22 RX ADMIN — Medication 0.5 MILLIGRAM(S): at 21:25

## 2018-03-22 RX ADMIN — Medication 100 MILLIGRAM(S): at 21:25

## 2018-03-22 RX ADMIN — RISPERIDONE 0.5 MILLIGRAM(S): 4 TABLET ORAL at 09:17

## 2018-03-22 RX ADMIN — LITHIUM CARBONATE 300 MILLIGRAM(S): 300 TABLET, EXTENDED RELEASE ORAL at 09:17

## 2018-03-22 RX ADMIN — RISPERIDONE 0.5 MILLIGRAM(S): 4 TABLET ORAL at 21:25

## 2018-03-22 RX ADMIN — Medication 0.5 MILLIGRAM(S): at 09:17

## 2018-03-23 PROCEDURE — 90853 GROUP PSYCHOTHERAPY: CPT

## 2018-03-23 PROCEDURE — 99232 SBSQ HOSP IP/OBS MODERATE 35: CPT | Mod: 25

## 2018-03-23 RX ADMIN — Medication 0.5 MILLIGRAM(S): at 21:05

## 2018-03-23 RX ADMIN — Medication 0.5 MILLIGRAM(S): at 09:34

## 2018-03-23 RX ADMIN — Medication 100 MILLIGRAM(S): at 21:06

## 2018-03-23 RX ADMIN — RISPERIDONE 0.5 MILLIGRAM(S): 4 TABLET ORAL at 21:06

## 2018-03-23 RX ADMIN — RISPERIDONE 0.5 MILLIGRAM(S): 4 TABLET ORAL at 09:34

## 2018-03-23 RX ADMIN — LITHIUM CARBONATE 300 MILLIGRAM(S): 300 TABLET, EXTENDED RELEASE ORAL at 21:05

## 2018-03-23 RX ADMIN — LITHIUM CARBONATE 300 MILLIGRAM(S): 300 TABLET, EXTENDED RELEASE ORAL at 09:34

## 2018-03-24 RX ADMIN — HALOPERIDOL DECANOATE 5 MILLIGRAM(S): 100 INJECTION INTRAMUSCULAR at 16:40

## 2018-03-24 RX ADMIN — HALOPERIDOL DECANOATE 5 MILLIGRAM(S): 100 INJECTION INTRAMUSCULAR at 10:40

## 2018-03-24 RX ADMIN — Medication 0.5 MILLIGRAM(S): at 08:53

## 2018-03-24 RX ADMIN — RISPERIDONE 0.5 MILLIGRAM(S): 4 TABLET ORAL at 21:38

## 2018-03-24 RX ADMIN — RISPERIDONE 0.5 MILLIGRAM(S): 4 TABLET ORAL at 08:53

## 2018-03-24 RX ADMIN — Medication 0.5 MILLIGRAM(S): at 21:38

## 2018-03-24 RX ADMIN — LITHIUM CARBONATE 300 MILLIGRAM(S): 300 TABLET, EXTENDED RELEASE ORAL at 21:38

## 2018-03-24 RX ADMIN — Medication 100 MILLIGRAM(S): at 21:38

## 2018-03-24 RX ADMIN — LITHIUM CARBONATE 300 MILLIGRAM(S): 300 TABLET, EXTENDED RELEASE ORAL at 08:53

## 2018-03-25 RX ADMIN — RISPERIDONE 0.5 MILLIGRAM(S): 4 TABLET ORAL at 21:30

## 2018-03-25 RX ADMIN — Medication 0.5 MILLIGRAM(S): at 21:30

## 2018-03-25 RX ADMIN — HALOPERIDOL DECANOATE 5 MILLIGRAM(S): 100 INJECTION INTRAMUSCULAR at 13:15

## 2018-03-25 RX ADMIN — Medication 100 MILLIGRAM(S): at 21:30

## 2018-03-25 RX ADMIN — LITHIUM CARBONATE 300 MILLIGRAM(S): 300 TABLET, EXTENDED RELEASE ORAL at 09:05

## 2018-03-25 RX ADMIN — Medication 1 EACH: at 21:31

## 2018-03-25 RX ADMIN — LITHIUM CARBONATE 300 MILLIGRAM(S): 300 TABLET, EXTENDED RELEASE ORAL at 21:30

## 2018-03-25 RX ADMIN — RISPERIDONE 0.5 MILLIGRAM(S): 4 TABLET ORAL at 09:05

## 2018-03-25 RX ADMIN — Medication 0.5 MILLIGRAM(S): at 09:05

## 2018-03-26 VITALS — RESPIRATION RATE: 18 BRPM | TEMPERATURE: 97 F

## 2018-03-26 LAB
BUN SERPL-MCNC: 15 MG/DL — SIGNIFICANT CHANGE UP (ref 7–23)
CALCIUM SERPL-MCNC: 8.9 MG/DL — SIGNIFICANT CHANGE UP (ref 8.4–10.5)
CHLORIDE SERPL-SCNC: 105 MMOL/L — SIGNIFICANT CHANGE UP (ref 98–107)
CO2 SERPL-SCNC: 24 MMOL/L — SIGNIFICANT CHANGE UP (ref 22–31)
CREAT SERPL-MCNC: 0.83 MG/DL — SIGNIFICANT CHANGE UP (ref 0.5–1.3)
GLUCOSE SERPL-MCNC: 90 MG/DL — SIGNIFICANT CHANGE UP (ref 70–99)
LITHIUM SERPL-MCNC: 0.8 MMOL/L — SIGNIFICANT CHANGE UP (ref 0.6–1.2)
POTASSIUM SERPL-MCNC: 4.3 MMOL/L — SIGNIFICANT CHANGE UP (ref 3.5–5.3)
POTASSIUM SERPL-SCNC: 4.3 MMOL/L — SIGNIFICANT CHANGE UP (ref 3.5–5.3)
SODIUM SERPL-SCNC: 138 MMOL/L — SIGNIFICANT CHANGE UP (ref 135–145)

## 2018-03-26 PROCEDURE — 99239 HOSP IP/OBS DSCHRG MGMT >30: CPT | Mod: 25

## 2018-03-26 PROCEDURE — 90834 PSYTX W PT 45 MINUTES: CPT | Mod: 59

## 2018-03-26 PROCEDURE — 90853 GROUP PSYCHOTHERAPY: CPT

## 2018-03-26 RX ORDER — CLONAZEPAM 1 MG
1 TABLET ORAL
Qty: 14 | Refills: 0
Start: 2018-03-26 | End: 2018-04-01

## 2018-03-26 RX ORDER — TRAZODONE HCL 50 MG
1 TABLET ORAL
Qty: 30 | Refills: 0
Start: 2018-03-26 | End: 2018-04-24

## 2018-03-26 RX ORDER — LITHIUM CARBONATE 300 MG/1
1 TABLET, EXTENDED RELEASE ORAL
Qty: 0 | Refills: 0 | DISCHARGE
Start: 2018-03-26 | End: 2018-04-24

## 2018-03-26 RX ORDER — LITHIUM CARBONATE 300 MG/1
1 TABLET, EXTENDED RELEASE ORAL
Qty: 60 | Refills: 0
Start: 2018-03-26 | End: 2018-04-24

## 2018-03-26 RX ORDER — RISPERIDONE 4 MG/1
1 TABLET ORAL
Qty: 60 | Refills: 0
Start: 2018-03-26 | End: 2018-04-24

## 2018-03-26 RX ADMIN — Medication 0.5 MILLIGRAM(S): at 10:52

## 2018-03-26 RX ADMIN — RISPERIDONE 0.5 MILLIGRAM(S): 4 TABLET ORAL at 10:52

## 2018-03-26 RX ADMIN — LITHIUM CARBONATE 300 MILLIGRAM(S): 300 TABLET, EXTENDED RELEASE ORAL at 10:52

## 2018-03-27 ENCOUNTER — OUTPATIENT (OUTPATIENT)
Dept: OUTPATIENT SERVICES | Facility: HOSPITAL | Age: 43
LOS: 1 days | Discharge: HOME | End: 2018-03-27
Payer: MEDICAID

## 2018-03-27 DIAGNOSIS — Z98.82 BREAST IMPLANT STATUS: Chronic | ICD-10-CM

## 2018-03-27 DIAGNOSIS — Z90.49 ACQUIRED ABSENCE OF OTHER SPECIFIED PARTS OF DIGESTIVE TRACT: Chronic | ICD-10-CM

## 2018-03-28 DIAGNOSIS — F60.3 BORDERLINE PERSONALITY DISORDER: ICD-10-CM

## 2018-03-28 DIAGNOSIS — F31.81 BIPOLAR II DISORDER: ICD-10-CM

## 2018-04-12 LAB
ALBUMIN SERPL ELPH-MCNC: 3.8 G/DL — SIGNIFICANT CHANGE UP (ref 3.3–5)
ALP SERPL-CCNC: 73 U/L — SIGNIFICANT CHANGE UP (ref 40–120)
ALT FLD-CCNC: 10 U/L — SIGNIFICANT CHANGE UP (ref 4–33)
AMPHET UR-MCNC: NEGATIVE — SIGNIFICANT CHANGE UP
AST SERPL-CCNC: 11 U/L — SIGNIFICANT CHANGE UP (ref 4–32)
BARBITURATES UR SCN-MCNC: NEGATIVE — SIGNIFICANT CHANGE UP
BENZODIAZ UR-MCNC: NEGATIVE — SIGNIFICANT CHANGE UP
BILIRUB SERPL-MCNC: < 0.2 MG/DL — LOW (ref 0.2–1.2)
BUN SERPL-MCNC: 12 MG/DL — SIGNIFICANT CHANGE UP (ref 7–23)
CALCIUM SERPL-MCNC: 9.4 MG/DL — SIGNIFICANT CHANGE UP (ref 8.4–10.5)
CANNABINOIDS UR-MCNC: NEGATIVE — SIGNIFICANT CHANGE UP
CHLORIDE SERPL-SCNC: 101 MMOL/L — SIGNIFICANT CHANGE UP (ref 98–107)
CO2 SERPL-SCNC: 25 MMOL/L — SIGNIFICANT CHANGE UP (ref 22–31)
COCAINE METAB.OTHER UR-MCNC: NEGATIVE — SIGNIFICANT CHANGE UP
CREAT SERPL-MCNC: 0.75 MG/DL — SIGNIFICANT CHANGE UP (ref 0.5–1.3)
GLUCOSE SERPL-MCNC: 84 MG/DL — SIGNIFICANT CHANGE UP (ref 70–99)
LITHIUM SERPL-MCNC: 0.77 MMOL/L — SIGNIFICANT CHANGE UP (ref 0.6–1.2)
METHADONE UR-MCNC: NEGATIVE — SIGNIFICANT CHANGE UP
OPIATES UR-MCNC: NEGATIVE — SIGNIFICANT CHANGE UP
OXYCODONE UR-MCNC: NEGATIVE — SIGNIFICANT CHANGE UP
PCP UR-MCNC: NEGATIVE — SIGNIFICANT CHANGE UP
POTASSIUM SERPL-MCNC: 4 MMOL/L — SIGNIFICANT CHANGE UP (ref 3.5–5.3)
POTASSIUM SERPL-SCNC: 4 MMOL/L — SIGNIFICANT CHANGE UP (ref 3.5–5.3)
PROT SERPL-MCNC: 7.3 G/DL — SIGNIFICANT CHANGE UP (ref 6–8.3)
SODIUM SERPL-SCNC: 139 MMOL/L — SIGNIFICANT CHANGE UP (ref 135–145)

## 2018-04-30 LAB — LITHIUM SERPL-MCNC: 0.91 MMOL/L — SIGNIFICANT CHANGE UP (ref 0.6–1.2)

## 2018-05-08 ENCOUNTER — OUTPATIENT (OUTPATIENT)
Dept: OUTPATIENT SERVICES | Facility: HOSPITAL | Age: 43
LOS: 1 days | Discharge: HOME | End: 2018-05-08

## 2018-05-08 DIAGNOSIS — Z98.82 BREAST IMPLANT STATUS: Chronic | ICD-10-CM

## 2018-05-08 DIAGNOSIS — Z90.49 ACQUIRED ABSENCE OF OTHER SPECIFIED PARTS OF DIGESTIVE TRACT: Chronic | ICD-10-CM

## 2018-05-09 DIAGNOSIS — F12.20 CANNABIS DEPENDENCE, UNCOMPLICATED: ICD-10-CM

## 2018-06-26 PROBLEM — Z00.00 ENCOUNTER FOR PREVENTIVE HEALTH EXAMINATION: Noted: 2018-06-26

## 2018-07-26 ENCOUNTER — APPOINTMENT (OUTPATIENT)
Dept: ENDOCRINOLOGY | Facility: CLINIC | Age: 43
End: 2018-07-26

## 2018-12-19 ENCOUNTER — INPATIENT (INPATIENT)
Facility: HOSPITAL | Age: 43
LOS: 1 days | Discharge: HOME | End: 2018-12-21
Attending: HOSPITALIST | Admitting: HOSPITALIST
Payer: MEDICAID

## 2018-12-19 ENCOUNTER — TRANSCRIPTION ENCOUNTER (OUTPATIENT)
Age: 43
End: 2018-12-19

## 2018-12-19 VITALS
WEIGHT: 117.95 LBS | HEART RATE: 89 BPM | RESPIRATION RATE: 19 BRPM | HEIGHT: 62 IN | TEMPERATURE: 98 F | OXYGEN SATURATION: 99 % | DIASTOLIC BLOOD PRESSURE: 98 MMHG | SYSTOLIC BLOOD PRESSURE: 152 MMHG

## 2018-12-19 DIAGNOSIS — T56.891A TOXIC EFFECT OF OTHER METALS, ACCIDENTAL (UNINTENTIONAL), INITIAL ENCOUNTER: ICD-10-CM

## 2018-12-19 DIAGNOSIS — Z98.82 BREAST IMPLANT STATUS: Chronic | ICD-10-CM

## 2018-12-19 DIAGNOSIS — E03.9 HYPOTHYROIDISM, UNSPECIFIED: ICD-10-CM

## 2018-12-19 DIAGNOSIS — Z90.49 ACQUIRED ABSENCE OF OTHER SPECIFIED PARTS OF DIGESTIVE TRACT: Chronic | ICD-10-CM

## 2018-12-19 DIAGNOSIS — F41.9 ANXIETY DISORDER, UNSPECIFIED: ICD-10-CM

## 2018-12-19 LAB
ALBUMIN SERPL ELPH-MCNC: 3.5 G/DL — SIGNIFICANT CHANGE UP (ref 3.3–5)
ALP SERPL-CCNC: 100 U/L — SIGNIFICANT CHANGE UP (ref 40–120)
ALT FLD-CCNC: 23 U/L — SIGNIFICANT CHANGE UP (ref 12–78)
AMPHET UR-MCNC: NEGATIVE — SIGNIFICANT CHANGE UP
ANION GAP SERPL CALC-SCNC: 5 MMOL/L — SIGNIFICANT CHANGE UP (ref 5–17)
APAP SERPL-MCNC: < 2 UG/ML (ref 10–30)
APPEARANCE UR: CLEAR — SIGNIFICANT CHANGE UP
AST SERPL-CCNC: 18 U/L — SIGNIFICANT CHANGE UP (ref 15–37)
BARBITURATES UR SCN-MCNC: NEGATIVE — SIGNIFICANT CHANGE UP
BASOPHILS # BLD AUTO: 0.05 K/UL — SIGNIFICANT CHANGE UP (ref 0–0.2)
BASOPHILS NFR BLD AUTO: 0.4 % — SIGNIFICANT CHANGE UP (ref 0–2)
BENZODIAZ UR-MCNC: NEGATIVE — SIGNIFICANT CHANGE UP
BILIRUB SERPL-MCNC: 0.2 MG/DL — SIGNIFICANT CHANGE UP (ref 0.2–1.2)
BILIRUB UR-MCNC: NEGATIVE — SIGNIFICANT CHANGE UP
BUN SERPL-MCNC: 8 MG/DL — SIGNIFICANT CHANGE UP (ref 7–23)
CALCIUM SERPL-MCNC: 8.7 MG/DL — SIGNIFICANT CHANGE UP (ref 8.5–10.1)
CHLORIDE SERPL-SCNC: 107 MMOL/L — SIGNIFICANT CHANGE UP (ref 96–108)
CO2 SERPL-SCNC: 27 MMOL/L — SIGNIFICANT CHANGE UP (ref 22–31)
COCAINE METAB.OTHER UR-MCNC: NEGATIVE — SIGNIFICANT CHANGE UP
COLOR SPEC: YELLOW — SIGNIFICANT CHANGE UP
CREAT SERPL-MCNC: 1.13 MG/DL — SIGNIFICANT CHANGE UP (ref 0.5–1.3)
DIFF PNL FLD: NEGATIVE — SIGNIFICANT CHANGE UP
EOSINOPHIL # BLD AUTO: 0.4 K/UL — SIGNIFICANT CHANGE UP (ref 0–0.5)
EOSINOPHIL NFR BLD AUTO: 3.3 % — SIGNIFICANT CHANGE UP (ref 0–6)
ETHANOL SERPL-MCNC: <10 MG/DL — SIGNIFICANT CHANGE UP (ref 0–10)
GLUCOSE SERPL-MCNC: 88 MG/DL — SIGNIFICANT CHANGE UP (ref 70–99)
GLUCOSE UR QL: NEGATIVE MG/DL — SIGNIFICANT CHANGE UP
HCG SERPL-ACNC: <1 MIU/ML — SIGNIFICANT CHANGE UP
HCT VFR BLD CALC: 39.8 % — SIGNIFICANT CHANGE UP (ref 34.5–45)
HGB BLD-MCNC: 12.7 G/DL — SIGNIFICANT CHANGE UP (ref 11.5–15.5)
IMM GRANULOCYTES NFR BLD AUTO: 0.4 % — SIGNIFICANT CHANGE UP (ref 0–1.5)
KETONES UR-MCNC: NEGATIVE — SIGNIFICANT CHANGE UP
LEUKOCYTE ESTERASE UR-ACNC: NEGATIVE — SIGNIFICANT CHANGE UP
LYMPHOCYTES # BLD AUTO: 1.84 K/UL — SIGNIFICANT CHANGE UP (ref 1–3.3)
LYMPHOCYTES # BLD AUTO: 15.1 % — SIGNIFICANT CHANGE UP (ref 13–44)
MCHC RBC-ENTMCNC: 29.4 PG — SIGNIFICANT CHANGE UP (ref 27–34)
MCHC RBC-ENTMCNC: 31.9 GM/DL — LOW (ref 32–36)
MCV RBC AUTO: 92.1 FL — SIGNIFICANT CHANGE UP (ref 80–100)
METHADONE UR-MCNC: NEGATIVE — SIGNIFICANT CHANGE UP
MONOCYTES # BLD AUTO: 0.45 K/UL — SIGNIFICANT CHANGE UP (ref 0–0.9)
MONOCYTES NFR BLD AUTO: 3.7 % — SIGNIFICANT CHANGE UP (ref 2–14)
NEUTROPHILS # BLD AUTO: 9.41 K/UL — HIGH (ref 1.8–7.4)
NEUTROPHILS NFR BLD AUTO: 77.1 % — HIGH (ref 43–77)
NITRITE UR-MCNC: NEGATIVE — SIGNIFICANT CHANGE UP
NRBC # BLD: 0 /100 WBCS — SIGNIFICANT CHANGE UP (ref 0–0)
OPIATES UR-MCNC: NEGATIVE — SIGNIFICANT CHANGE UP
PCP SPEC-MCNC: SIGNIFICANT CHANGE UP
PCP UR-MCNC: NEGATIVE — SIGNIFICANT CHANGE UP
PH UR: 8 — SIGNIFICANT CHANGE UP (ref 5–8)
PLATELET # BLD AUTO: 300 K/UL — SIGNIFICANT CHANGE UP (ref 150–400)
POTASSIUM SERPL-MCNC: 4.1 MMOL/L — SIGNIFICANT CHANGE UP (ref 3.5–5.3)
POTASSIUM SERPL-SCNC: 4.1 MMOL/L — SIGNIFICANT CHANGE UP (ref 3.5–5.3)
PROT SERPL-MCNC: 7.4 GM/DL — SIGNIFICANT CHANGE UP (ref 6–8.3)
PROT UR-MCNC: NEGATIVE MG/DL — SIGNIFICANT CHANGE UP
RBC # BLD: 4.32 M/UL — SIGNIFICANT CHANGE UP (ref 3.8–5.2)
RBC # FLD: 12.7 % — SIGNIFICANT CHANGE UP (ref 10.3–14.5)
SALICYLATES SERPL-MCNC: <1.7 MG/DL — LOW (ref 2.8–20)
SODIUM SERPL-SCNC: 139 MMOL/L — SIGNIFICANT CHANGE UP (ref 135–145)
SP GR SPEC: 1.01 — SIGNIFICANT CHANGE UP (ref 1.01–1.02)
THC UR QL: NEGATIVE — SIGNIFICANT CHANGE UP
UROBILINOGEN FLD QL: NEGATIVE MG/DL — SIGNIFICANT CHANGE UP
WBC # BLD: 12.2 K/UL — HIGH (ref 3.8–10.5)
WBC # FLD AUTO: 12.2 K/UL — HIGH (ref 3.8–10.5)

## 2018-12-19 PROCEDURE — 71045 X-RAY EXAM CHEST 1 VIEW: CPT | Mod: 26

## 2018-12-19 PROCEDURE — 99285 EMERGENCY DEPT VISIT HI MDM: CPT

## 2018-12-19 PROCEDURE — 99223 1ST HOSP IP/OBS HIGH 75: CPT

## 2018-12-19 RX ORDER — SODIUM CHLORIDE 9 MG/ML
1000 INJECTION INTRAMUSCULAR; INTRAVENOUS; SUBCUTANEOUS ONCE
Refills: 0 | Status: COMPLETED | OUTPATIENT
Start: 2018-12-19 | End: 2018-12-19

## 2018-12-19 RX ORDER — NICOTINE POLACRILEX 2 MG
1 GUM BUCCAL DAILY
Refills: 0 | Status: DISCONTINUED | OUTPATIENT
Start: 2018-12-19 | End: 2018-12-21

## 2018-12-19 RX ADMIN — Medication 1 MILLIGRAM(S): at 23:54

## 2018-12-19 RX ADMIN — SODIUM CHLORIDE 1000 MILLILITER(S): 9 INJECTION INTRAMUSCULAR; INTRAVENOUS; SUBCUTANEOUS at 19:00

## 2018-12-19 RX ADMIN — SODIUM CHLORIDE 1000 MILLILITER(S): 9 INJECTION INTRAMUSCULAR; INTRAVENOUS; SUBCUTANEOUS at 19:05

## 2018-12-19 RX ADMIN — SODIUM CHLORIDE 1000 MILLILITER(S): 9 INJECTION INTRAMUSCULAR; INTRAVENOUS; SUBCUTANEOUS at 20:00

## 2018-12-19 RX ADMIN — Medication 1 MILLIGRAM(S): at 20:26

## 2018-12-19 RX ADMIN — SODIUM CHLORIDE 1000 MILLILITER(S): 9 INJECTION INTRAMUSCULAR; INTRAVENOUS; SUBCUTANEOUS at 18:38

## 2018-12-19 NOTE — H&P ADULT - HISTORY OF PRESENT ILLNESS
43 year old female PMH anxiety, hypothyroidism, bipolar disorder ( on Lithium ) presents today c/o having tremors for the last one week, now worsening x 1 week, pt's lithium dosage was increased last week and she was started on zyprexa yesterday, she was told to the ER if her symptoms persist. Denies chest pain, sob, palpitations, increased urination; heat intolerance, diarrhea; pt states that she did fall at home due to unsteadiness, but denies head injury or LOC. Pt former alcohol abuser, last drink 2 months ago, no change in synthroid dose, denies heat intolerance.

## 2018-12-19 NOTE — PATIENT PROFILE ADULT - NSPROPASSIVESMOKEEXPOSURE_GEN_A_NUR
History of Present Illness





- General


Stated complaint: MAY HAVE STD


Time Seen by Provider: 05/18/18 16:40


Source: Patient


Mode of Arrival: Ambulatory


Limitations: No limitations





- History of Present Illness


Initial comments: 





39 yo female presents with a concern about an STD exposure.  She reports her 

male sexual partner was informed by his prior female partner that she has 

chlamydia.  The patient is asymptomatic.  She has no history STD.  She would 

like to be checked and treated. 


MD Complaint: Possible STD


-: Days(s)


Radiation: Other


Severity: Mild


Quality: Aching


Consistency: Constant


Improves with: None


Worsens with: None


Associated Symptoms: Denies other symptoms





- Related Data


 Home Medications











 Medication  Instructions  Recorded  Confirmed  Last Taken


 


Omeprazole 40 mg PO DAILY 05/18/18 05/18/18 1 Day Ago





    ~05/17/18








 Previous Rx's











 Medication  Instructions  Recorded


 


Metronidazole [Flagyl] 500 mg PO BID #14 tablet 05/18/18











 Allergies











Allergy/AdvReac Type Severity Reaction Status Date / Time


 


cefaclor [From Ceclor] Allergy Severe HIVES Verified 05/18/18 16:48














Review of Systems


Constitutional: Denies: Chills, Fever, Malaise, Weakness


Eyes: Denies: Eye discharge


ENT: Denies: Congestion, Throat pain


Respiratory: Denies: Cough, Dyspnea, Hemoptysis, Stridor, Wheezes


Cardiovascular: Denies: Chest pain, Palpitations, Syncope


Endocrine: Denies: Fatigue


Gastrointestinal: Denies: Abdominal pain, Diarrhea, Nausea, Vomiting


Genitourinary: Denies: Abnormal menses, Discharge, Dyspareunia, Dysuria, 

Frequency, Hematuria, Incontinence, Urgency


Musculoskeletal: Denies: Arthralgia, Back pain, Myalgia, Neck pain


Skin: Denies: Bruising, Change in color, Rash


Neurological: Denies: Headache, Numbness, Weakness


Psychiatric: Denies: Anxiety


Hematological/Lymphatic: Denies: Blood Clots, Easy bleeding, Easy bruising, 

Swollen glands





Past Medical History





- SOCIAL HISTORY


Smoking Status: Current every day smoker


Alcohol Use Comment: quit in June


Drug Use: Heavy


Drug Use Detail:: Marijuana





- RESPIRATORY


Hx Respiratory Disorders: No





- CARDIOVASCULAR


Hx Cardio Disorders: No





- NEURO


Hx Neuro Disorders: No





- GI


Hx GI Disorders: Yes


Hx Abdominal Pain: Yes


Hx Reflux: Yes


Hx Nausea/Vomiting: Yes (cyclical vomiting syndrome)


Hx Ulcer: Yes (Gastric Ulcer 6/2017)


Comment:: hx of a tiffanie wise tear w/ hyperemesis gravidarum, ulcerative 

esophagitis





- 


Hx Genitourinary Disorders: No





- ENDOCRINE


Hx Endocrine Disorders: No





- MUSCULOSKELETAL


Hx Musculoskeletal Disorders: No





- PSYCH


Hx Psych Problems: No





- HEMATOLOGY/ONCOLOGY


Hx Hematology/Oncology Disorders: No





Physical Exam





- General


General Appearance: Alert, Oriented x3, Cooperative, No acute distress


Limitations: No limitations





- Head


Head exam: Normal inspection





- Eye


Eye exam: Normal appearance, PERRL.  negative: Conjunctival injection, Scleral 

icterus





- ENT


ENT exam: Normal exam, Mucous membranes moist


Ear exam: Normal external inspection


Nasal Exam: Normal inspection


Mouth exam: Normal external inspection





- Neck


Neck exam: Normal inspection, Full ROM.  negative: Tenderness





- Respiratory


Respiratory exam: Normal lung sounds bilaterally.  negative: Respiratory 

distress





- Cardiovascular


Cardiovascular Exam: Regular rate, Normal rhythm, Normal heart sounds





- GI/Abdominal


GI/Abdominal exam: Soft.  negative: Tenderness





- Rectal


Rectal exam: Deferred





- Extremities


Extremities exam: Normal inspection





- Back


Back exam: Reports: Normal inspection, Full ROM.  Denies: Muscle spasm, Rash 

noted, Tenderness





- Neurological


Neurological exam: Alert, Normal gait, Oriented X3





- Psychiatric


Psychiatric exam: Normal affect, Normal mood





- Skin


Skin exam: Dry, Intact, Normal color, Warm





Course





- Reevaluation(s)


Reevaluation #1: 





05/18/18 17:05


The patient has a cephalosporin allergy


per the guidelines 2gm of Athromycin ordered


05/18/18 17:07


The UA and HCG are negative


05/18/18 18:39


The wet prep was positive for clue cells


She was provided Flagyl


She was given instructions for no alcohol while on Flagyl, no unprotected sex 2 

weeks, follow up at the health department for a GYN and possible HIV testing.





Disposition


Disposition: Discharge


Clinical Impression: 


 STD exposure, Bacterial vaginosis





Disposition: Home, Self-Care


Condition: (1) Good


Instructions:  Bacterial Vaginosis (ED), Sexually Transmitted Diseases (ED), 

Safe Sex (ED)


Additional Instructions: 


Call to get a new get a new family doctor as well as call the health department 

for follow up


You should not have unprotected sex for 2 weeks





Prescriptions: 


Metronidazole [Flagyl] 500 mg PO BID #14 tablet


Forms:  Patient Portal Access


Time of Disposition: 17:07





Quality





- Quality Measures


Quality Measures: N/A





- Blood Pressure Screening


Does Patient Have Any of the Following: No


Blood Pressure Classification: Hypertensive Reading


Systolic Measurement: 127


Diastolic Measurement: 95


Screening for High Blood Pressure: < Pre-Hypertensive BP, F/U Documented > [

]


Pre-Hypertensive Follow-up Interventions: Referral to alternative/primary care 

provider. Unknown

## 2018-12-19 NOTE — H&P ADULT - FAMILY HISTORY
Sibling  Still living? Yes, Estimated age: Age Unknown  Family history of bipolar disorder, Age at diagnosis: Age Unknown

## 2018-12-19 NOTE — ED PROVIDER NOTE - OBJECTIVE STATEMENT
43 year old female with h/o anxiety, hypothyroidism, bipolar disorder ( on Lithium ) presents today c/o having tremors for the last one week, now worsening x 1 week, pt's lithium dosage was increased last week and she was started on xyprexa yesterday, she was told to the ER if her symptoms persist (-) chest pain (-) sob (-) palpiations, pt states that she did fall at home due to unsteadiness, (-) head injury or LOC

## 2018-12-19 NOTE — ED ADULT NURSE NOTE - NSIMPLEMENTINTERV_GEN_ALL_ED
Implemented All Fall Risk Interventions:  [place] to call system. Call bell, personal items and telephone within reach. Instruct patient to call for assistance. Room bathroom lighting operational. Non-slip footwear when patient is off stretcher. Physically safe environment: no spills, clutter or unnecessary equipment. Stretcher in lowest position, wheels locked, appropriate side rails in place. Provide visual cue, wrist band, yellow gown, etc. Monitor gait and stability. Monitor for mental status changes and reorient to person, place, and time. Review medications for side effects contributing to fall risk. Reinforce activity limits and safety measures with patient and family.

## 2018-12-19 NOTE — H&P ADULT - ASSESSMENT
43 year old female PMH anxiety, hypothyroidism, bipolar disorder ( on Lithium ) presents today c/o having tremors for the last one week, now worsening x 1 week, pt's lithium dosage was increased last week. Possible lithium toxicity. Doubt alcohol withdrawal, hypoglycemia, or hyperthyroidism, no illicit drugs; lithium level sent.  IMPROVE VTE Individual Risk Assessment          RISK                                                          Points    [  ] Previous VTE                                                3    [  ] Thrombophilia                                             2    [  ] Lower limb paralysis                                    2        (unable to hold up >15 seconds)      [  ] Current Cancer                                             2         (within 6 months)    [  ] Immobilization > 24 hrs                              1    [  ] ICU/CCU stay > 24 hours                            1    [  ] Age > 60                                                    1    IMPROVE VTE Score _________

## 2018-12-19 NOTE — ED ADULT NURSE NOTE - PMH
Alcohol abuse    Anxiety    Hypothyroid    Migraine    Severe episode of recurrent major depressive disorder, with psychotic features

## 2018-12-19 NOTE — H&P ADULT - NSHPLABSRESULTS_GEN_ALL_CORE
LABS:                        12.7   . )-----------( 300      ( 19 Dec 2018 18:48 )             39.8         139  |  107  |  8   ----------------------------<  88  4.1   |  27  |  1.13    Ca    8.7      19 Dec 2018 18:48    TPro  7.4  /  Alb  3.5  /  TBili  0.2  /  DBili  x   /  AST  18  /  ALT  23  /  AlkPhos  100        Urinalysis Basic - ( 19 Dec 2018 18:48 )    Color: Yellow / Appearance: Clear / S.010 / pH: x  Gluc: x / Ketone: Negative  / Bili: Negative / Urobili: Negative mg/dL   Blood: x / Protein: Negative mg/dL / Nitrite: Negative   Leuk Esterase: Negative / RBC: x / WBC x   Sq Epi: x / Non Sq Epi: x / Bacteria: x      CAPILLARY BLOOD GLUCOSE  Amphetamine, Urine: Negative (18 @ 18:48)  Cocaine Metabolite, Urine: Negative (18 @ 18:48)  Alcohol, Blood: <10: mg/dL (. @ 18:48)              RADIOLOGY & ADDITIONAL TESTS:  < from: Xray Chest 1 View AP/PA. (18 @ 19:54) >    Negative for acute cardiopulmonary process.    < end of copied text >      Imaging Personally Reviewed:  [ x] YES  [ ] NO

## 2018-12-19 NOTE — H&P ADULT - NSHPREVIEWOFSYSTEMS_GEN_ALL_CORE
REVIEW OF SYSTEMS:  CONSTITUTIONAL: No fever, weight loss, or fatigue  EYES: No eye pain, visual disturbances, or discharge  ENMT:  No difficulty hearing, tinnitus, vertigo; No sinus or throat pain  NECK: No pain or stiffness  BREASTS: No pain, masses, or nipple discharge  RESPIRATORY: No cough, wheezing, chills or hemoptysis; No shortness of breath  CARDIOVASCULAR: No chest pain, palpitations, dizziness, or leg swelling  GASTROINTESTINAL: No abdominal or epigastric pain. No nausea, vomiting, or hematemesis; No diarrhea or constipation. No melena or hematochezia.  GENITOURINARY: No dysuria, frequency, hematuria, or incontinence  NEUROLOGICAL: No headaches, memory loss, loss of strength, numbness, or tremors  SKIN: No itching, burning, rashes, or lesions   LYMPH NODES: No enlarged glands  ENDOCRINE: No heat or cold intolerance; No hair loss  MUSCULOSKELETAL: No joint pain or swelling; No muscle, back, or extremity pain  PSYCHIATRIC: No depression, +anxiety, no mood swings, or difficulty sleeping  HEME/LYMPH: No easy bruising, or bleeding gums  ALLERGY AND IMMUNOLOGIC: No hives or eczema

## 2018-12-19 NOTE — ED ADULT TRIAGE NOTE - CHIEF COMPLAINT QUOTE
patient BIBA , patient c/o of tremors started on Friday , patient c/o of  mild headache and nausea , denied chest pain , patient stated " I was send here to check my lithium level "

## 2018-12-19 NOTE — ED ADULT TRIAGE NOTE - SOURCE OF INFORMATION
Patient presents to clinic today for a possible UTI.  Nola Chaparro CMA..............8/1/2018........3:00 PM       Patient/EMS

## 2018-12-20 LAB
ANION GAP SERPL CALC-SCNC: 5 MMOL/L — SIGNIFICANT CHANGE UP (ref 5–17)
BASOPHILS # BLD AUTO: 0.03 K/UL — SIGNIFICANT CHANGE UP (ref 0–0.2)
BASOPHILS NFR BLD AUTO: 0.3 % — SIGNIFICANT CHANGE UP (ref 0–2)
BUN SERPL-MCNC: 7 MG/DL — SIGNIFICANT CHANGE UP (ref 7–23)
CALCIUM SERPL-MCNC: 8.1 MG/DL — LOW (ref 8.5–10.1)
CHLORIDE SERPL-SCNC: 109 MMOL/L — HIGH (ref 96–108)
CO2 SERPL-SCNC: 27 MMOL/L — SIGNIFICANT CHANGE UP (ref 22–31)
CREAT SERPL-MCNC: 0.87 MG/DL — SIGNIFICANT CHANGE UP (ref 0.5–1.3)
CULTURE RESULTS: NO GROWTH — SIGNIFICANT CHANGE UP
EOSINOPHIL # BLD AUTO: 0.43 K/UL — SIGNIFICANT CHANGE UP (ref 0–0.5)
EOSINOPHIL NFR BLD AUTO: 4.9 % — SIGNIFICANT CHANGE UP (ref 0–6)
GLUCOSE SERPL-MCNC: 89 MG/DL — SIGNIFICANT CHANGE UP (ref 70–99)
HCT VFR BLD CALC: 35.4 % — SIGNIFICANT CHANGE UP (ref 34.5–45)
HGB BLD-MCNC: 11.5 G/DL — SIGNIFICANT CHANGE UP (ref 11.5–15.5)
IMM GRANULOCYTES NFR BLD AUTO: 0.3 % — SIGNIFICANT CHANGE UP (ref 0–1.5)
LITHIUM SERPL-MCNC: 1.17 MMOL/L — SIGNIFICANT CHANGE UP (ref 0.6–1.2)
LYMPHOCYTES # BLD AUTO: 1.76 K/UL — SIGNIFICANT CHANGE UP (ref 1–3.3)
LYMPHOCYTES # BLD AUTO: 20.1 % — SIGNIFICANT CHANGE UP (ref 13–44)
MCHC RBC-ENTMCNC: 29.7 PG — SIGNIFICANT CHANGE UP (ref 27–34)
MCHC RBC-ENTMCNC: 32.5 GM/DL — SIGNIFICANT CHANGE UP (ref 32–36)
MCV RBC AUTO: 91.5 FL — SIGNIFICANT CHANGE UP (ref 80–100)
MONOCYTES # BLD AUTO: 0.43 K/UL — SIGNIFICANT CHANGE UP (ref 0–0.9)
MONOCYTES NFR BLD AUTO: 4.9 % — SIGNIFICANT CHANGE UP (ref 2–14)
NEUTROPHILS # BLD AUTO: 6.06 K/UL — SIGNIFICANT CHANGE UP (ref 1.8–7.4)
NEUTROPHILS NFR BLD AUTO: 69.5 % — SIGNIFICANT CHANGE UP (ref 43–77)
NRBC # BLD: 0 /100 WBCS — SIGNIFICANT CHANGE UP (ref 0–0)
PLATELET # BLD AUTO: 246 K/UL — SIGNIFICANT CHANGE UP (ref 150–400)
POTASSIUM SERPL-MCNC: 4.3 MMOL/L — SIGNIFICANT CHANGE UP (ref 3.5–5.3)
POTASSIUM SERPL-SCNC: 4.3 MMOL/L — SIGNIFICANT CHANGE UP (ref 3.5–5.3)
RBC # BLD: 3.87 M/UL — SIGNIFICANT CHANGE UP (ref 3.8–5.2)
RBC # FLD: 12.9 % — SIGNIFICANT CHANGE UP (ref 10.3–14.5)
SODIUM SERPL-SCNC: 141 MMOL/L — SIGNIFICANT CHANGE UP (ref 135–145)
SPECIMEN SOURCE: SIGNIFICANT CHANGE UP
TSH SERPL-MCNC: 94.6 UU/ML — HIGH (ref 0.36–3.74)
WBC # BLD: 8.74 K/UL — SIGNIFICANT CHANGE UP (ref 3.8–10.5)
WBC # FLD AUTO: 8.74 K/UL — SIGNIFICANT CHANGE UP (ref 3.8–10.5)

## 2018-12-20 PROCEDURE — 99233 SBSQ HOSP IP/OBS HIGH 50: CPT

## 2018-12-20 RX ORDER — TRAZODONE HCL 50 MG
100 TABLET ORAL ONCE
Refills: 0 | Status: COMPLETED | OUTPATIENT
Start: 2018-12-20 | End: 2018-12-20

## 2018-12-20 RX ORDER — TRAZODONE HCL 50 MG
100 TABLET ORAL AT BEDTIME
Refills: 0 | Status: DISCONTINUED | OUTPATIENT
Start: 2018-12-20 | End: 2018-12-21

## 2018-12-20 RX ORDER — ONDANSETRON 8 MG/1
4 TABLET, FILM COATED ORAL ONCE
Refills: 0 | Status: COMPLETED | OUTPATIENT
Start: 2018-12-20 | End: 2018-12-20

## 2018-12-20 RX ORDER — IBUPROFEN 200 MG
400 TABLET ORAL ONCE
Refills: 0 | Status: COMPLETED | OUTPATIENT
Start: 2018-12-20 | End: 2018-12-20

## 2018-12-20 RX ORDER — ENOXAPARIN SODIUM 100 MG/ML
40 INJECTION SUBCUTANEOUS DAILY
Refills: 0 | Status: DISCONTINUED | OUTPATIENT
Start: 2018-12-20 | End: 2018-12-21

## 2018-12-20 RX ORDER — LEVOTHYROXINE SODIUM 125 MCG
100 TABLET ORAL DAILY
Refills: 0 | Status: DISCONTINUED | OUTPATIENT
Start: 2018-12-20 | End: 2018-12-21

## 2018-12-20 RX ORDER — METOCLOPRAMIDE HCL 10 MG
10 TABLET ORAL ONCE
Refills: 0 | Status: COMPLETED | OUTPATIENT
Start: 2018-12-20 | End: 2018-12-20

## 2018-12-20 RX ADMIN — Medication 100 MILLIGRAM(S): at 03:18

## 2018-12-20 RX ADMIN — Medication 400 MILLIGRAM(S): at 23:00

## 2018-12-20 RX ADMIN — Medication 1 MILLIGRAM(S): at 10:09

## 2018-12-20 RX ADMIN — Medication 1 PATCH: at 11:38

## 2018-12-20 RX ADMIN — Medication 400 MILLIGRAM(S): at 22:14

## 2018-12-20 RX ADMIN — Medication 400 MILLIGRAM(S): at 03:20

## 2018-12-20 RX ADMIN — ONDANSETRON 4 MILLIGRAM(S): 8 TABLET, FILM COATED ORAL at 00:23

## 2018-12-20 RX ADMIN — Medication 1 MILLIGRAM(S): at 18:29

## 2018-12-20 RX ADMIN — Medication 1 PATCH: at 19:30

## 2018-12-20 RX ADMIN — Medication 400 MILLIGRAM(S): at 02:50

## 2018-12-20 RX ADMIN — Medication 100 MILLIGRAM(S): at 22:14

## 2018-12-20 RX ADMIN — Medication 10 MILLIGRAM(S): at 02:50

## 2018-12-20 NOTE — CONSULT NOTE ADULT - SUBJECTIVE AND OBJECTIVE BOX
Patient is a 43y old  Female who presents with a chief complaint of Tremulousness. (20 Dec 2018 10:38)      Reason For Consult:  Hypothyroidism     HPI:  43 year old female PMH anxiety, hypothyroidism, bipolar disorder ( on Lithium ) presents today c/o having tremors for the last one week, now worsening x 1 week, pt's lithium dosage was increased last week and she was started on zyprexa yesterday, she was told to the ER if her symptoms persist. Denies chest pain, sob, palpitations, increased urination; heat intolerance, diarrhea; pt states that she did fall at home due to unsteadiness, but denies head injury or LOC. Pt former alcohol abuser, last drink 2 months ago, no change in synthroid dose, denies heat intolerance. (19 Dec 2018 22:48)  patient has many symptoms mimicking Hyperthyroidism - secondary to LiCO3  also has Grave's diathesis with stare B/L   No history of of adrenal or Pituitary insufficiency     PAST MEDICAL & SURGICAL HISTORY:  Hypothyroid  Migraine  Alcohol abuse  Severe episode of recurrent major depressive disorder, with psychotic features  Anxiety  S/P cholecystectomy  Delivered by  section  S/P breast augmentation      FAMILY HISTORY:  Family history of bipolar disorder (Sibling)        Social History:    MEDICATIONS  (STANDING):  levothyroxine 100 MICROGram(s) Oral daily  nicotine -  14 mG/24Hr(s) Patch 1 patch Transdermal daily    MEDICATIONS  (PRN):  LORazepam     Tablet 1 milliGRAM(s) Oral every 8 hours PRN Anxiety      REVIEW OF SYSTEMS:  CONSTITUTIONAL:  as per HPI, anxious  HEENT:  Eyes:  No diplopia or blurred vision. ENT:  No earache, sore throat or runny nose.  CARDIOVASCULAR:  No pressure, squeezing, strangling, tightness, heaviness or aching about the chest, neck, axilla or epigastrium.  RESPIRATORY:  No cough, shortness of breath, PND or orthopnea.  GASTROINTESTINAL:  No nausea, vomiting or diarrhea.  GENITOURINARY:  No dysuria, frequency or urgency. No Blood in urine  MUSCULOSKELETAL:  no joint aches, no muscle pain, myalgia  SKIN:  No change in skin, hair or nails.  NEUROLOGIC:  No paresthesias, fasciculations, seizures or weakness.  ENDOCRINE:  No heat or cold intolerance?, polyuria or polydipsia. abnormal weight gain or loss, oral thrush  HEMATOLOGICAL:  No easy bruising or bleeding.     T(C): 36.7 (18 @ 11:01), Max: 37.1 (18 @ 21:42)  HR: 72 (18 @ 11:01) (55 - 89)  BP: 106/69 (18 @ 11:01) (92/53 - 152/98)  RR: 16 (18 @ 11:01) (16 - 19)  SpO2: 96% (18 @ 11:01) (96% - 99%)  Wt(kg): --    PHYSICAL EXAM:   GENERAL: NAD, well-groomed, well-developed  HEAD:  Atraumatic, Normocephalic  EYES: EOMI, PERRLA, conjunctiva and sclera clear, stare +  ENMT: No tonsillar erythema, exudates, or enlargement; Moist mucous membranes, Good dentition, No lesions  NECK: Supple, No JVD, slight thyromegaly  NERVOUS SYSTEM:  Alert & Oriented X3, Good concentration; Motor Strength 5/5 B/L upper and lower extremities; DTRs 2+ intact and symmetric  CHEST/LUNG: Clear to percussion bilaterally; No rales, rhonchi, wheezing, or rubs  HEART: Regular rate and rhythm; No murmurs, rubs, or gallops  ABDOMEN: Soft, Nontender, Nondistended; Bowel sounds present  EXTREMITIES:  2+ Peripheral Pulses, No clubbing, cyanosis, or edema  LYMPH: No lymphadenopathy noted  SKIN: No rashes or lesions, mild features of Hypothyroidism     CAPILLARY BLOOD GLUCOSE                                11.5   8.74  )-----------( 246      ( 20 Dec 2018 07:41 )             35.4       CMP:   @ 07:41  SGPT --  Albumin --   Alk Phos --   Anion Gap 5   SGOT --   Total Bili --   BUN 7   Calcium Total 8.1   CO2 27   Chloride 109   Creatinine 0.87   eGFR if AA 95   eGFR if non AA 82   Glucose 89   Potassium 4.3   Protein --   Sodium 141      Thyroid Function Tests:   @ 07:41 TSH 94.600 FreeT4 -- T3 -- Anti TPO -- Anti Thyroglobulin Ab -- TSI --      Diabetes Tests:     Parathyroids:     Adrenals:       Radiology:

## 2018-12-20 NOTE — PROGRESS NOTE ADULT - SUBJECTIVE AND OBJECTIVE BOX
Patient is a 43y old  Female who presents with a chief complaint of Tremulousness. (19 Dec 2018 22:48)      INTERVAL HPI/OVERNIGHT EVENTS:      REVIEW OF SYSTEMS:    MEDICATIONS  (STANDING):  nicotine -  14 mG/24Hr(s) Patch 1 patch Transdermal daily    MEDICATIONS  (PRN):  LORazepam     Tablet 1 milliGRAM(s) Oral every 8 hours PRN Anxiety      Allergies    Percocet 10/325 (Flushing (Skin); Faint)    Intolerances        Vital Signs Last 24 Hrs  T(C): 36.8 (20 Dec 2018 04:56), Max: 37.1 (19 Dec 2018 21:42)  T(F): 98.2 (20 Dec 2018 04:56), Max: 98.7 (19 Dec 2018 21:42)  HR: 55 (20 Dec 2018 04:56) (55 - 89)  BP: 92/53 (20 Dec 2018 04:56) (92/53 - 152/98)  BP(mean): --  RR: 16 (20 Dec 2018 04:56) (16 - 19)  SpO2: 98% (20 Dec 2018 04:56) (97% - 99%)    PHYSICAL EXAM:  GENERAL: NAD.  CHEST/LUNG: Clear to auscultation; No rales, rhonchi, or wheezing.  Respiratory effort does not appear labored.  HEART: Regular rate and rhythm; S1 and S2,  no murmurs, rubs, or gallops.  ABDOMEN: Soft, not tender to palpation.  No masses or HSM appreciated.  No distension.  Bowel sounds present.  EXTREMITIES:  No clubbing, cyanosis, or edema.  Moves all extremities with strength 5/5.  SKIN: No obvious rashes or lesions.  Turgor okay.  NEURO:  Alert and oriented x 3, no focal sensory or  motor deficit, DTR 2+ bilaterally.    LABS:                        11.5   8.74  )-----------( 246      ( 20 Dec 2018 07:41 )             35.4     12-20    141  |  109<H>  |  7   ----------------------------<  89  4.3   |  27  |  0.87    Ca    8.1<L>      20 Dec 2018 07:41    TPro  7.4  /  Alb  3.5  /  TBili  0.2  /  DBili  x   /  AST  18  /  ALT  23  /  AlkPhos  100  12-19      Urinalysis Basic - ( 19 Dec 2018 18:48 )    Color: Yellow / Appearance: Clear / S.010 / pH: x  Gluc: x / Ketone: Negative  / Bili: Negative / Urobili: Negative mg/dL   Blood: x / Protein: Negative mg/dL / Nitrite: Negative   Leuk Esterase: Negative / RBC: x / WBC x   Sq Epi: x / Non Sq Epi: x / Bacteria: x      CAPILLARY BLOOD GLUCOSE Patient is a 43y old  Female who presents with a chief complaint of Tremulousness. (19 Dec 2018 22:48)      INTERVAL HPI/OVERNIGHT EVENTS:    Tremors - somewhat improved.  States lithium dose was increased several weeks ago and Lexapro started recently.  Denied any other acute neuro deficit.    Hypothyroidism - TSH elevated.  States she stopped levothyroxine several weeks ago on her own.  Reports some cold intolerance and constipation.    Alcoholism - states she was a heavy drinker in the past but not for months.    REVIEW OF SYSTEMS:  Denied acute SOB or cough.    MEDICATIONS  (STANDING):  nicotine -  14 mG/24Hr(s) Patch 1 patch Transdermal daily    MEDICATIONS  (PRN):  LORazepam     Tablet 1 milliGRAM(s) Oral every 8 hours PRN Anxiety      Allergies    Percocet 10/325 (Flushing (Skin); Faint)    Intolerance      Vital Signs Last 24 Hrs  T(C): 36.8 (20 Dec 2018 04:56), Max: 37.1 (19 Dec 2018 21:42)  T(F): 98.2 (20 Dec 2018 04:56), Max: 98.7 (19 Dec 2018 21:42)  HR: 55 (20 Dec 2018 04:56) (55 - 89)  BP: 92/53 (20 Dec 2018 04:56) (92/53 - 152/98)  BP(mean): --  RR: 16 (20 Dec 2018 04:56) (16 - 19)  SpO2: 98% (20 Dec 2018 04:56) (97% - 99%)    PHYSICAL EXAM:  GENERAL: NAD.  CHEST/LUNG: Clear to auscultation; No rales, rhonchi, or wheezing.  Respiratory effort does not appear labored.  HEART: Regular rate and rhythm; S1 and S2,  no murmurs, rubs, or gallops.  ABDOMEN: Soft, not tender to palpation.  No masses or HSM appreciated.  No distension.  Bowel sounds present.  EXTREMITIES:  No clubbing, cyanosis, or edema.  Moves all extremities with strength 5/5.  SKIN: No obvious rashes or lesions.  Turgor okay.  NEURO:  Alert and oriented x 3, no focal sensory or  motor deficit, DTR 2+ bilaterally.  Resting and intention tremor.    LABS:                        11.5   8.74  )-----------( 246      ( 20 Dec 2018 07:41 )             35.4     12-20    141  |  109<H>  |  7   ----------------------------<  89  4.3   |  27  |  0.87    Ca    8.1<L>      20 Dec 2018 07:41    TPro  7.4  /  Alb  3.5  /  TBili  0.2  /  DBili  x   /  AST  18  /  ALT  23  /  AlkPhos  100  12-19      Urinalysis Basic - ( 19 Dec 2018 18:48 )    Color: Yellow / Appearance: Clear / S.010 / pH: x  Gluc: x / Ketone: Negative  / Bili: Negative / Urobili: Negative mg/dL   Blood: x / Protein: Negative mg/dL / Nitrite: Negative   Leuk Esterase: Negative / RBC: x / WBC x   Sq Epi: x / Non Sq Epi: x / Bacteria: x      CAPILLARY BLOOD GLUCOSE

## 2018-12-20 NOTE — CONSULT NOTE ADULT - PROBLEM SELECTOR RECOMMENDATION 9
patient had been on 50 mcg levothyroxine for Lithium induced Hypothyroidism   non-compliant with levothyroxine x 2 weeks   TSH is 94, but as the patient has long standing Hypothyroidism - symptoms wise she is compensated  start on 100 mcg levothyroxine and titrate it down once better   correct TSH to 3.0 or below   also The recovery of TSH may lag behind time wise compared to  thyroid hormones like T4 and T3   Follow up free T4   Check cortisol to make sure she does not have any Adrenal insufficiency concurrently , if normal will not require any Hydrocortisone   Thank You for the courtesy of this consultation !!!

## 2018-12-21 ENCOUNTER — TRANSCRIPTION ENCOUNTER (OUTPATIENT)
Age: 43
End: 2018-12-21

## 2018-12-21 VITALS
RESPIRATION RATE: 18 BRPM | TEMPERATURE: 99 F | DIASTOLIC BLOOD PRESSURE: 80 MMHG | OXYGEN SATURATION: 99 % | SYSTOLIC BLOOD PRESSURE: 125 MMHG | HEART RATE: 78 BPM

## 2018-12-21 LAB
ANION GAP SERPL CALC-SCNC: 8 MMOL/L — SIGNIFICANT CHANGE UP (ref 5–17)
BUN SERPL-MCNC: 6 MG/DL — LOW (ref 7–23)
CALCIUM SERPL-MCNC: 8.4 MG/DL — LOW (ref 8.5–10.1)
CHLORIDE SERPL-SCNC: 107 MMOL/L — SIGNIFICANT CHANGE UP (ref 96–108)
CO2 SERPL-SCNC: 26 MMOL/L — SIGNIFICANT CHANGE UP (ref 22–31)
CREAT SERPL-MCNC: 0.89 MG/DL — SIGNIFICANT CHANGE UP (ref 0.5–1.3)
FLUAV SPEC QL CULT: NEGATIVE — SIGNIFICANT CHANGE UP
FLUBV AG SPEC QL IA: NEGATIVE — SIGNIFICANT CHANGE UP
GLUCOSE SERPL-MCNC: 90 MG/DL — SIGNIFICANT CHANGE UP (ref 70–99)
HCT VFR BLD CALC: 39.9 % — SIGNIFICANT CHANGE UP (ref 34.5–45)
HGB BLD-MCNC: 12.7 G/DL — SIGNIFICANT CHANGE UP (ref 11.5–15.5)
MCHC RBC-ENTMCNC: 28.9 PG — SIGNIFICANT CHANGE UP (ref 27–34)
MCHC RBC-ENTMCNC: 31.8 GM/DL — LOW (ref 32–36)
MCV RBC AUTO: 90.9 FL — SIGNIFICANT CHANGE UP (ref 80–100)
NRBC # BLD: 0 /100 WBCS — SIGNIFICANT CHANGE UP (ref 0–0)
PLATELET # BLD AUTO: 279 K/UL — SIGNIFICANT CHANGE UP (ref 150–400)
POTASSIUM SERPL-MCNC: 3.9 MMOL/L — SIGNIFICANT CHANGE UP (ref 3.5–5.3)
POTASSIUM SERPL-SCNC: 3.9 MMOL/L — SIGNIFICANT CHANGE UP (ref 3.5–5.3)
RBC # BLD: 4.39 M/UL — SIGNIFICANT CHANGE UP (ref 3.8–5.2)
RBC # FLD: 13.2 % — SIGNIFICANT CHANGE UP (ref 10.3–14.5)
SODIUM SERPL-SCNC: 141 MMOL/L — SIGNIFICANT CHANGE UP (ref 135–145)
WBC # BLD: 13.28 K/UL — HIGH (ref 3.8–10.5)
WBC # FLD AUTO: 13.28 K/UL — HIGH (ref 3.8–10.5)

## 2018-12-21 PROCEDURE — 99239 HOSP IP/OBS DSCHRG MGMT >30: CPT

## 2018-12-21 RX ORDER — LEVOTHYROXINE SODIUM 125 MCG
1 TABLET ORAL
Qty: 30 | Refills: 0
Start: 2018-12-21 | End: 2019-01-19

## 2018-12-21 RX ORDER — NICOTINE POLACRILEX 2 MG
14 GUM BUCCAL
Qty: 0 | Refills: 0 | DISCHARGE
Start: 2018-12-21

## 2018-12-21 RX ADMIN — Medication 100 MICROGRAM(S): at 05:34

## 2018-12-21 RX ADMIN — Medication 1 PATCH: at 11:29

## 2018-12-21 NOTE — DISCHARGE NOTE ADULT - HOSPITAL COURSE
Patient had lithium level    Vital Signs Last 24 Hrs  T(C): 37.1 (21 Dec 2018 11:44), Max: 37.1 (21 Dec 2018 11:44)  T(F): 98.8 (21 Dec 2018 11:44), Max: 98.8 (21 Dec 2018 11:44)  HR: 78 (21 Dec 2018 11:44) (67 - 78)  BP: 125/80 (21 Dec 2018 11:44) (106/63 - 132/94)  BP(mean): --  RR: 18 (21 Dec 2018 11:44) (16 - 18)  SpO2: 99% (21 Dec 2018 11:44) (97% - 100%) Patient had lithium level done which was within normal limits.  Neurology evaluated patient and impression was tremors related to lithium use.  Patient advised to see her mental health provider to discuss use and dose.  Patient cleared by neurology for discharge.    TSH markedly elevated at 94.  Endocrinology consulted and started levothyroxine at 100 mcg daily and advised to follow-up as outpatient.  Patient was on 50 mcg daily started earlier this year but stopped several weeks ago on her own.    Patient complained of URI symptoms after admission and WBC mildly elevated, but afebrile.  Viral panel sent with flu negative but entero/rhinovirus positive.  Patient had been instructed on importance of hand washing and good hygiene, avoid immunosuppressed persons.  Advised to follow-up with PCP.    Time spent was 30 to 35 minutes in discharge management, including discussion with medical providers.    Vital Signs Last 24 Hrs  T(C): 37.1 (21 Dec 2018 11:44), Max: 37.1 (21 Dec 2018 11:44)  T(F): 98.8 (21 Dec 2018 11:44), Max: 98.8 (21 Dec 2018 11:44)  HR: 78 (21 Dec 2018 11:44) (67 - 78)  BP: 125/80 (21 Dec 2018 11:44) (106/63 - 132/94)  BP(mean): --  RR: 18 (21 Dec 2018 11:44) (16 - 18)  SpO2: 99% (21 Dec 2018 11:44) (97% - 100%)    GENERAL: NAD.  CHEST/LUNG: Clear to auscultation; No rales, rhonchi, or wheezing.  Respiratory effort does not appear labored.  HEART: Regular rate and rhythm; S1 and S2,  no murmurs, rubs, or gallops.  ABDOMEN: Soft, not tender to palpation.  No masses or HSM appreciated.  No distension.  Bowel sounds present.  EXTREMITIES:  No clubbing, cyanosis, or edema.  Moves all extremities with strength 5/5.  SKIN: No obvious rashes or lesions.  Turgor okay.  NEURO:  Alert and oriented x 3, no focal sensory or  motor deficit, DTR 2+ bilaterally.  Resting and intention tremor.

## 2018-12-21 NOTE — DISCHARGE NOTE ADULT - PLAN OF CARE
Follow-up with mental health provider for evaluation of medications. Follow-up with mental health provider for evaluation of medications within one week. Take levothyroxine 100 mcg by mouth daily.  Follow-up with endocrinologist, Dr. Steven, in one week. Continue same outpatient medications.  Follow-up with mental health provider for evaluation of medications within one week.

## 2018-12-21 NOTE — DISCHARGE NOTE ADULT - PROVIDER TOKENS
TOKEN:'[id number]',FREE:[LAST:[Mental health provider],PHONE:[(   )    -],FAX:[(   )    -],ADDRESS:[Follow-up within one week.]],FREE:[LAST:[Primary care provider],PHONE:[(   )    -],FAX:[(   )    -],ADDRESS:[Follow-up within one week.]]

## 2018-12-21 NOTE — DISCHARGE NOTE ADULT - PATIENT PORTAL LINK FT
You can access the [facility] Patient Portal, offered by [facility], by registering with the following website: [email or web address]

## 2018-12-21 NOTE — CONSULT NOTE ADULT - SUBJECTIVE AND OBJECTIVE BOX
Subjective Complaints:  Historian:       Consult requested by ER doctor:                  Attending:     HPI:  43 year old female PMH anxiety, hypothyroidism, bipolar disorder ( on Lithium ) presents today c/o having tremors for the last one week, now worsening x 1 week, pt's lithium dosage was increased last week and she was started on zyprexa yesterday, she was told to the ER if her symptoms persist. Denies chest pain, sob, palpitations, increased urination; heat intolerance, diarrhea; pt states that she did fall at home due to unsteadiness, but denies head injury or LOC. Pt former alcohol abuser, last drink 2 months ago, no change in synthroid dose, denies heat intolerance. (19 Dec 2018 22:48)patient tremors have decreased     JUAN FRANCISCO DIAZ    PAST MEDICAL & SURGICAL HISTORY:  Hypothyroid  Migraine  Alcohol abuse  Severe episode of recurrent major depressive disorder, with psychotic features  Anxiety  S/P cholecystectomy  Delivered by  section  S/P breast augmentation  43yFemale    MEDICATIONS  (STANDING):  enoxaparin Injectable 40 milliGRAM(s) SubCutaneous daily  levothyroxine 100 MICROGram(s) Oral daily  nicotine -  14 mG/24Hr(s) Patch 1 patch Transdermal daily    MEDICATIONS  (PRN):  LORazepam     Tablet 1 milliGRAM(s) Oral every 8 hours PRN Anxiety  traZODone 100 milliGRAM(s) Oral at bedtime PRN sleep      Allergies    Percocet 10/325 (Flushing (Skin); Faint)    Intolerances      FAMILY HISTORY:  Family history of bipolar disorder (Sibling)      REVIEW OF SYSTEMS:  General:  No wt loss, fevers, chills, night sweats  Eyes:  Good vision, no reported pain  ENT:  No sore throat, pain, runny nose, dysphagia  CV:  No pain, palpitatioins, hypo/hypertension  Resp:  No dyspnea, cough, tachypnea, wheezing  GI:  No pain, nausea, vomiting, diarrhea, constipatiion  :  No pain, bleeding, incontinence, nocturia  Muscle:  No pain, weakness  Breast:  No pain, abscess, mass, discharge  Neuro:  No weakness, tingling, memory problems  Psych:  No fatigue, insomnia, mood problems, depression  Endocrine:  No polyuria, polydypsia, cold/heat intolerance  Heme:  No petechiae, ecchymosis, easy bruisability  Skin:  No rash, tattoos, scars, edema      Vital Signs Last 24 Hrs  T(C): 37.1 (21 Dec 2018 11:44), Max: 37.1 (21 Dec 2018 11:44)  T(F): 98.8 (21 Dec 2018 11:44), Max: 98.8 (21 Dec 2018 11:44)  HR: 78 (21 Dec 2018 11:44) (67 - 78)  BP: 125/80 (21 Dec 2018 11:44) (106/63 - 132/94)  BP(mean): --  RR: 18 (21 Dec 2018 11:44) (16 - 18)  SpO2: 99% (21 Dec 2018 11:44) (97% - 100%)    GENERAL PHYSICAL EXAM:  General:  Appears stated age, well-groomed, well-nourished, no distress  HEENT:  NC/AT, patent nares w/ pink mucosa, OP clear w/o lesions, PERRL, EOMI, conjunctivae clear, no thyromegaly, nodules, adenopathy, no JVD  Chest:  Full & symmetric excursion, no increased effort, breath sounds clear  Cardiovascular:  Regular rhythm, S1, S2, no murmur/rub/S3/S4, no carotid/femoral/abdominal bruit, radial/pedal pulses 2+, no edema  Abdomen:  Soft, non-tender, non-distended, normoactive bowel sounds, no HSM  Extremities:  Gait & station:   Digits:   Nails:   Joints, Bones, Muscles:   ROM:   Stability:  Skin:  No rash/erythema/ecchymoses/petechiae/wounds/abscess/warm/dry  Musculoskeletal:  Full ROM in all joints w/o swelling/tenderness/effusion    NEUROLOGICAL EXAM:  HENT:  Normocephalic head; atraumatic head.  Neck supple.  ENT: normal looking.  Mental State:    Alert.  Fully oriented to person, place and date.  Coherent.  Speech clear and intact.  Cooperative.  Responds appropriately.    Cranial Nerves:  II-XII:   Pupils round and reactive to light and accommodation.  Extraocular movements full.  Visual fields full (no homonymous hemianopsia).  Visual acuity wnl.  Facial symmetry intact.  Tongue midline.  Motor Functions:  Moves all extremities.  No pronator drift of UE. motor strength is 5/5 mild action tremors in the upper extremities     Sensory Functions:   Intact to touch and pinprick to face and extremities.    Reflexes:  Deep tendon reflexes normoactive to biceps, knees and ankles. plantar responses are flexor   Cerebellar Testing:    Finger to nose intact.  Nystagmus absent.  Gait : unremarkable     LABS:                        12.7   13.28 )-----------( 279      ( 21 Dec 2018 08:19 )             39.9         141  |  107  |  6<L>  ----------------------------<  90  3.9   |  26  |  0.89    Ca    8.4<L>      21 Dec 2018 08:19    TPro  7.4  /  Alb  3.5  /  TBili  0.2  /  DBili  x   /  AST  18  /  ALT  23  /  AlkPhos  100          Urinalysis Basic - ( 19 Dec 2018 18:48 )    Color: Yellow / Appearance: Clear / S.010 / pH: x  Gluc: x / Ketone: Negative  / Bili: Negative / Urobili: Negative mg/dL   Blood: x / Protein: Negative mg/dL / Nitrite: Negative   Leuk Esterase: Negative / RBC: x / WBC x   Sq Epi: x / Non Sq Epi: x / Bacteria: x        RADIOLOGY & ADDITIONAL STUDIES:    Complete Blood Count: AM Sched. Collection: 21-Dec-2018 05:00 ( @ 16:47)  Basic Metabolic Panel: AM Sched. Collection: 21-Dec-2018 05:00 ( @ 16:47)  enoxaparin Injectable: [Ordered as LOVENOX]  40 milliGRAM(s), SubCutaneous, daily  Provider's Contact #: [phone] ( @ 16:47)  traZODone: [Known as DESYREL]  100 milliGRAM(s), Oral, at bedtime, PRN for sleep  Administration Instructions: This is a Look-alike/Sound-alike Medication  Provider's Contact #: [phone] ( @ 20:05)  ibuprofen  Tablet.: [Known as MOTRIN.]  400 milliGRAM(s), Oral, once, Stop After 1 Doses  Special Instructions: administer with food  Provider's Contact #: [phone] ( @ 21:59)  Isolation Precautions:     Isolation Type: CONTACT;  Indication for Isolation: Parainfluenza    Isolation Type: DROPLET;  Indication for Isolation: Influenza ( @ 09:33)  FLU A B RSV Detection by PCR: STAT ( @ 09:33)  Isolation Precautions:     Isolation Type: DROPLET;  Indication for Isolation: Influenza    Isolation Type: DROPLET;  Indication for Isolation: Rule out influenza ( @ 10:09)  Rapid Respiratory Viral Panel: STAT ( @ 10:09)  Influenza Virus A and B Rapid Antigen: 10:10 ( @ 10:35)  Rapid Respiratory Viral Panel: 10:10 ( @ 11:22)      Assessment & Opinion: 42 y o woman with h/o bipolar disorder on lithium and neuroleptic is seen for tremors which most likely related to lithium ,her condition has improved and she can be discharged   DX TREMORS  ( SECONDARY TO LITHIUM )     Recommendations: PATIENT CAN BE DISCHARGED  ,ADJUSMENT OF MEDS AS OUTPATIENT BY HER PSYCHIATRIST )

## 2018-12-21 NOTE — PROGRESS NOTE ADULT - ASSESSMENT
hypothyroidism
1.  Tremors.  Follow-up lithium level.  Consider neuro evaluation pending result.    2.  Hypothyroidism.  Consult endocrinology.  Case discussed with Dr. Steven and he will initiate levothyroxine and steroids not advised at this time.      3.  Bipolar disorder.  Continue outpatient medications.  Psych consult if adjustments needed.

## 2018-12-21 NOTE — DISCHARGE NOTE ADULT - MEDICATION SUMMARY - MEDICATIONS TO CHANGE
I will SWITCH the dose or number of times a day I take the medications listed below when I get home from the hospital:    Synthroid 50 mcg (0.05 mg) oral tablet  -- 1 tab(s) by mouth once a day

## 2018-12-21 NOTE — DISCHARGE NOTE ADULT - MEDICATION SUMMARY - MEDICATIONS TO TAKE
I will START or STAY ON the medications listed below when I get home from the hospital:    gabapentin 600 mg oral tablet  -- orally 2 times a day  -- Indication: For Neuropathy    traZODone 100 mg oral tablet  -- 1 tab(s) by mouth once a day (at bedtime)-Sleep  -- Indication: For sleep    Lexapro  -- orally once a day  -- Indication: For Anxiety    lithium 300 mg oral capsule  -- 1 cap(s) by mouth 3 times a day -Mood  -- Indication: For bipolar    nicotine 14 mg/24 hr transdermal film, extended release  -- 14 milligram(s) by transdermal patch once a day  -- Indication: For smoking cessation    levothyroxine 100 mcg (0.1 mg) oral tablet  -- 1 tab(s) by mouth once a day  -- Indication: For thyroid replacement

## 2018-12-21 NOTE — DISCHARGE NOTE ADULT - CARE PLAN
Principal Discharge DX:	Tremors of nervous system  Goal:	Follow-up with mental health provider for evaluation of medications.  Assessment and plan of treatment:	Follow-up with mental health provider for evaluation of medications within one week.  Secondary Diagnosis:	Acquired hypothyroidism  Assessment and plan of treatment:	Take levothyroxine 100 mcg by mouth daily.  Follow-up with endocrinologist, Dr. Steven, in one week.  Secondary Diagnosis:	Anxiety  Assessment and plan of treatment:	Continue same outpatient medications.  Follow-up with mental health provider for evaluation of medications within one week.

## 2018-12-21 NOTE — PROGRESS NOTE ADULT - SUBJECTIVE AND OBJECTIVE BOX
Patient is a 43y old  Female who presents with a chief complaint of Tremulousness. (20 Dec 2018 11:43)      INTERVAL HPI/OVERNIGHT EVENTS:  pt with no complaints    MEDICATIONS  (STANDING):  enoxaparin Injectable 40 milliGRAM(s) SubCutaneous daily  levothyroxine 100 MICROGram(s) Oral daily  nicotine -  14 mG/24Hr(s) Patch 1 patch Transdermal daily    MEDICATIONS  (PRN):  LORazepam     Tablet 1 milliGRAM(s) Oral every 8 hours PRN Anxiety  traZODone 100 milliGRAM(s) Oral at bedtime PRN sleep      REVIEW OF SYSTEMS:  CONSTITUTIONAL: No fever, weight loss, or fatigue  RESPIRATORY: No cough, wheezing, chills or hemoptysis; No shortness of breath  CARDIOVASCULAR: No chest pain, palpitations, dizziness, or leg swelling  GASTROINTESTINAL: No abdominal or epigastric pain. No nausea, vomiting, or hematemesis; No diarrhea or constipation. No melena or hematochezia.  ENDOCRINE: No heat or cold intolerance; No hair loss      Vital Signs Last 24 Hrs  T(C): 36.6 (21 Dec 2018 05:16), Max: 36.7 (20 Dec 2018 11:01)  T(F): 97.9 (21 Dec 2018 05:16), Max: 98.1 (20 Dec 2018 11:01)  HR: 72 (21 Dec 2018 05:16) (67 - 77)  BP: 106/63 (21 Dec 2018 05:16) (106/63 - 132/94)  BP(mean): --  RR: 18 (21 Dec 2018 05:16) (16 - 18)  SpO2: 100% (21 Dec 2018 05:16) (96% - 100%)    PHYSICAL EXAM:  GENERAL: NAD, well-groomed, well-developed  CHEST/LUNG: Clear to percussion bilaterally; No rales, rhonchi, wheezing, or rubs  HEART: Regular rate and rhythm; No murmurs, rubs, or gallops  A      LABS:                        12.7   13.28 )-----------( 279      ( 21 Dec 2018 08:19 )             39.9         141  |  107  |  6<L>  ----------------------------<  90  3.9   |  26  |  0.89    Ca    8.4<L>      21 Dec 2018 08:19    TPro  7.4  /  Alb  3.5  /  TBili  0.2  /  DBili  x   /  AST  18  /  ALT  23  /  AlkPhos  100  12-19      Urinalysis Basic - ( 19 Dec 2018 18:48 )    Color: Yellow / Appearance: Clear / S.010 / pH: x  Gluc: x / Ketone: Negative  / Bili: Negative / Urobili: Negative mg/dL   Blood: x / Protein: Negative mg/dL / Nitrite: Negative   Leuk Esterase: Negative / RBC: x / WBC x   Sq Epi: x / Non Sq Epi: x / Bacteria: x      CAPILLARY BLOOD GLUCOSE        Lipid panel:               RADIOLOGY & ADDITIONAL TESTS:

## 2018-12-21 NOTE — PROGRESS NOTE ADULT - PROBLEM SELECTOR PLAN 1
uncontrolled  continue on increased dose of levothyroxine 100mcg daily  recheck tfts as outpt in 6wks

## 2018-12-21 NOTE — DISCHARGE NOTE ADULT - CARE PROVIDER_API CALL
Dano Steven), EndocrinologyMetabDiabetes  [address]  [address]  [place] [address]  Phone: [phone]  Fax: [phone]    Mental health provider,   Follow-up within one week.  Phone: (   )    -  Fax: (   )    -    Primary care provider,   Follow-up within one week.  Phone: (   )    -  Fax: (   )    -

## 2018-12-26 DIAGNOSIS — T43.595A ADVERSE EFFECT OF OTHER ANTIPSYCHOTICS AND NEUROLEPTICS, INITIAL ENCOUNTER: ICD-10-CM

## 2018-12-26 DIAGNOSIS — F10.11 ALCOHOL ABUSE, IN REMISSION: ICD-10-CM

## 2018-12-26 DIAGNOSIS — F41.9 ANXIETY DISORDER, UNSPECIFIED: ICD-10-CM

## 2018-12-26 DIAGNOSIS — G25.1 DRUG-INDUCED TREMOR: ICD-10-CM

## 2018-12-26 DIAGNOSIS — F32.2 MAJOR DEPRESSIVE DISORDER, SINGLE EPISODE, SEVERE WITHOUT PSYCHOTIC FEATURES: ICD-10-CM

## 2018-12-26 DIAGNOSIS — R25.1 TREMOR, UNSPECIFIED: ICD-10-CM

## 2018-12-26 DIAGNOSIS — F31.9 BIPOLAR DISORDER, UNSPECIFIED: ICD-10-CM

## 2018-12-26 DIAGNOSIS — E03.9 HYPOTHYROIDISM, UNSPECIFIED: ICD-10-CM

## 2019-01-16 ENCOUNTER — EMERGENCY (EMERGENCY)
Facility: HOSPITAL | Age: 44
LOS: 1 days | Discharge: HOME | End: 2019-01-16
Admitting: EMERGENCY MEDICINE
Payer: MEDICAID

## 2019-01-16 VITALS
DIASTOLIC BLOOD PRESSURE: 80 MMHG | RESPIRATION RATE: 16 BRPM | TEMPERATURE: 99 F | OXYGEN SATURATION: 100 % | SYSTOLIC BLOOD PRESSURE: 131 MMHG | HEART RATE: 71 BPM

## 2019-01-16 DIAGNOSIS — Z90.49 ACQUIRED ABSENCE OF OTHER SPECIFIED PARTS OF DIGESTIVE TRACT: Chronic | ICD-10-CM

## 2019-01-16 DIAGNOSIS — Z98.82 BREAST IMPLANT STATUS: Chronic | ICD-10-CM

## 2019-01-16 DIAGNOSIS — F60.3 BORDERLINE PERSONALITY DISORDER: ICD-10-CM

## 2019-01-16 LAB
ALBUMIN SERPL ELPH-MCNC: 4.5 G/DL — SIGNIFICANT CHANGE UP (ref 3.3–5)
ALP SERPL-CCNC: 87 U/L — SIGNIFICANT CHANGE UP (ref 40–120)
ALT FLD-CCNC: 17 U/L — SIGNIFICANT CHANGE UP (ref 4–33)
AMPHET UR-MCNC: NEGATIVE — SIGNIFICANT CHANGE UP
ANION GAP SERPL CALC-SCNC: 14 MMO/L — SIGNIFICANT CHANGE UP (ref 7–14)
APAP SERPL-MCNC: < 15 UG/ML — LOW (ref 15–25)
APPEARANCE UR: SIGNIFICANT CHANGE UP
AST SERPL-CCNC: 20 U/L — SIGNIFICANT CHANGE UP (ref 4–32)
BACTERIA # UR AUTO: NEGATIVE — SIGNIFICANT CHANGE UP
BARBITURATES UR SCN-MCNC: NEGATIVE — SIGNIFICANT CHANGE UP
BASOPHILS # BLD AUTO: 0.04 K/UL — SIGNIFICANT CHANGE UP (ref 0–0.2)
BASOPHILS NFR BLD AUTO: 0.3 % — SIGNIFICANT CHANGE UP (ref 0–2)
BENZODIAZ UR-MCNC: NEGATIVE — SIGNIFICANT CHANGE UP
BILIRUB SERPL-MCNC: 0.3 MG/DL — SIGNIFICANT CHANGE UP (ref 0.2–1.2)
BILIRUB UR-MCNC: NEGATIVE — SIGNIFICANT CHANGE UP
BLOOD UR QL VISUAL: NEGATIVE — SIGNIFICANT CHANGE UP
BUN SERPL-MCNC: 8 MG/DL — SIGNIFICANT CHANGE UP (ref 7–23)
CALCIUM SERPL-MCNC: 9.2 MG/DL — SIGNIFICANT CHANGE UP (ref 8.4–10.5)
CANNABINOIDS UR-MCNC: POSITIVE — SIGNIFICANT CHANGE UP
CHLORIDE SERPL-SCNC: 103 MMOL/L — SIGNIFICANT CHANGE UP (ref 98–107)
CO2 SERPL-SCNC: 22 MMOL/L — SIGNIFICANT CHANGE UP (ref 22–31)
COCAINE METAB.OTHER UR-MCNC: NEGATIVE — SIGNIFICANT CHANGE UP
COLOR SPEC: SIGNIFICANT CHANGE UP
CREAT SERPL-MCNC: 0.96 MG/DL — SIGNIFICANT CHANGE UP (ref 0.5–1.3)
EOSINOPHIL # BLD AUTO: 0.3 K/UL — SIGNIFICANT CHANGE UP (ref 0–0.5)
EOSINOPHIL NFR BLD AUTO: 2.5 % — SIGNIFICANT CHANGE UP (ref 0–6)
ETHANOL BLD-MCNC: < 10 MG/DL — SIGNIFICANT CHANGE UP
GLUCOSE SERPL-MCNC: 109 MG/DL — HIGH (ref 70–99)
GLUCOSE UR-MCNC: NEGATIVE — SIGNIFICANT CHANGE UP
HCG SERPL-ACNC: < 5 MIU/ML — SIGNIFICANT CHANGE UP
HCT VFR BLD CALC: 38.9 % — SIGNIFICANT CHANGE UP (ref 34.5–45)
HGB BLD-MCNC: 12.5 G/DL — SIGNIFICANT CHANGE UP (ref 11.5–15.5)
HYALINE CASTS # UR AUTO: SIGNIFICANT CHANGE UP
IMM GRANULOCYTES NFR BLD AUTO: 0.3 % — SIGNIFICANT CHANGE UP (ref 0–1.5)
KETONES UR-MCNC: NEGATIVE — SIGNIFICANT CHANGE UP
LEUKOCYTE ESTERASE UR-ACNC: SIGNIFICANT CHANGE UP
LITHIUM SERPL-MCNC: 0.8 MMOL/L — SIGNIFICANT CHANGE UP (ref 0.6–1.2)
LYMPHOCYTES # BLD AUTO: 19.8 % — SIGNIFICANT CHANGE UP (ref 13–44)
LYMPHOCYTES # BLD AUTO: 2.34 K/UL — SIGNIFICANT CHANGE UP (ref 1–3.3)
MCHC RBC-ENTMCNC: 30.1 PG — SIGNIFICANT CHANGE UP (ref 27–34)
MCHC RBC-ENTMCNC: 32.1 % — SIGNIFICANT CHANGE UP (ref 32–36)
MCV RBC AUTO: 93.7 FL — SIGNIFICANT CHANGE UP (ref 80–100)
METHADONE UR-MCNC: NEGATIVE — SIGNIFICANT CHANGE UP
MONOCYTES # BLD AUTO: 0.53 K/UL — SIGNIFICANT CHANGE UP (ref 0–0.9)
MONOCYTES NFR BLD AUTO: 4.5 % — SIGNIFICANT CHANGE UP (ref 2–14)
NEUTROPHILS # BLD AUTO: 8.57 K/UL — HIGH (ref 1.8–7.4)
NEUTROPHILS NFR BLD AUTO: 72.6 % — SIGNIFICANT CHANGE UP (ref 43–77)
NITRITE UR-MCNC: NEGATIVE — SIGNIFICANT CHANGE UP
NRBC # FLD: 0 K/UL — LOW (ref 25–125)
OPIATES UR-MCNC: NEGATIVE — SIGNIFICANT CHANGE UP
OXYCODONE UR-MCNC: NEGATIVE — SIGNIFICANT CHANGE UP
PCP UR-MCNC: NEGATIVE — SIGNIFICANT CHANGE UP
PH UR: 7.5 — SIGNIFICANT CHANGE UP (ref 5–8)
PLATELET # BLD AUTO: 247 K/UL — SIGNIFICANT CHANGE UP (ref 150–400)
PMV BLD: 9.1 FL — SIGNIFICANT CHANGE UP (ref 7–13)
POTASSIUM SERPL-MCNC: 3.9 MMOL/L — SIGNIFICANT CHANGE UP (ref 3.5–5.3)
POTASSIUM SERPL-SCNC: 3.9 MMOL/L — SIGNIFICANT CHANGE UP (ref 3.5–5.3)
PROT SERPL-MCNC: 7.1 G/DL — SIGNIFICANT CHANGE UP (ref 6–8.3)
PROT UR-MCNC: 20 — SIGNIFICANT CHANGE UP
RBC # BLD: 4.15 M/UL — SIGNIFICANT CHANGE UP (ref 3.8–5.2)
RBC # FLD: 13.5 % — SIGNIFICANT CHANGE UP (ref 10.3–14.5)
RBC CASTS # UR COMP ASSIST: SIGNIFICANT CHANGE UP (ref 0–?)
SALICYLATES SERPL-MCNC: < 5 MG/DL — LOW (ref 15–30)
SODIUM SERPL-SCNC: 139 MMOL/L — SIGNIFICANT CHANGE UP (ref 135–145)
SP GR SPEC: 1.01 — SIGNIFICANT CHANGE UP (ref 1–1.04)
SQUAMOUS # UR AUTO: SIGNIFICANT CHANGE UP
TSH SERPL-MCNC: 10.69 UIU/ML — HIGH (ref 0.27–4.2)
UROBILINOGEN FLD QL: NORMAL — SIGNIFICANT CHANGE UP
WBC # BLD: 11.82 K/UL — HIGH (ref 3.8–10.5)
WBC # FLD AUTO: 11.82 K/UL — HIGH (ref 3.8–10.5)
WBC UR QL: >50 — HIGH (ref 0–?)

## 2019-01-16 PROCEDURE — 90792 PSYCH DIAG EVAL W/MED SRVCS: CPT

## 2019-01-16 PROCEDURE — 99285 EMERGENCY DEPT VISIT HI MDM: CPT

## 2019-01-16 RX ORDER — TETANUS AND DIPHTHERIA TOXOIDS ADSORBED 2; 2 [LF]/.5ML; [LF]/.5ML
0.5 INJECTION INTRAMUSCULAR ONCE
Refills: 0 | Status: COMPLETED | OUTPATIENT
Start: 2019-01-16 | End: 2019-01-16

## 2019-01-16 RX ADMIN — TETANUS AND DIPHTHERIA TOXOIDS ADSORBED 0.5 MILLILITER(S): 2; 2 INJECTION INTRAMUSCULAR at 18:50

## 2019-01-16 NOTE — ED ADULT TRIAGE NOTE - CHIEF COMPLAINT QUOTE
A&Ox3 brought in by ems from [facility] out reach program for suicidal thoughts and was hearing voices earlier but not now, pt does not have a plan at this time, hx of cutting wrist in past PMH depression, pt denies SI/Hi at this time A&Ox3 brought in by ems from [facility] out reach program for suicidal thoughts and was hearing voices earlier but not now, pt does not have a plan at this time, hx of cutting wrist in past PMH depression, pt denies SI/Hi at this time, MINE [facility] called pt sent to

## 2019-01-16 NOTE — ED ADULT NURSE REASSESSMENT NOTE - NS ED NURSE REASSESS COMMENT FT1
Break Coverage - Received report from RN CC pt calm & cooperative c/o depression & Si pt noted with Left arm self inflicted abrasions pt denies Hi/AVh presently safety & comfort measures maintained eval on going.

## 2019-01-16 NOTE — ED ADULT NURSE NOTE - CHIEF COMPLAINT QUOTE
A&Ox3 brought in by ems from [facility] out reach program for suicidal thoughts and was hearing voices earlier but not now, pt does not have a plan at this time, hx of cutting wrist in past PMH depression, pt denies SI/Hi at this time, [facility] called pt sent to

## 2019-01-16 NOTE — ED BEHAVIORAL HEALTH NOTE - BEHAVIORAL HEALTH NOTE
Writer spoke with patient's mother, Joanna Morgan, [phone], on the phone, for collateral information. She did not seem to have accurate detailed information regarding the patient's illness but reported the following:    The aptient resides downstairs in the home of her parents. She is in OP treatment at [facility]. She has had 2 past IP episodes, most recently in March of last year at [facility]. She went to [facility] today, verbalized thoughts of harming herself, and was sent to the [facility] ED for an evaluation.     The patient had seemed anxious 2 weeks ago on the day she went to her appointment at [facility], which often seems to happen. She seemed to be doing well until a couple of days ago, when she reported she and her boyfriend, Enrique, broke up. She then became sad, with poor appetite. 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 Yesterday she left work early, saying she did not feel well. The informant, who is sick with a virus, thought she may have meant she felt physically unwell also, and noted the patient has not eaten well for the past 2 days. Then today, the patient called the informant and stated that she had tried to cut herself. She didn't say why or what exactly she had done. She has not verbalized passive or active suicidal ideation to the informant. The informant was not aware of any self-injurious behavior. She stated that recently she suggested to the patient that it would be best if she did not watch movies in which patient's harm themselves or others, but the patient replied that it does not affect her in the least, and denied suicidal thoughts. She was also unaware that the patient has any history of substance abuse. The patient has not reported any auditory or visual hallucinations since she was first diagnosed with mentall illness. The patient is medication-compliant but has not been successful in finding medications which are effective. The patient has been sleeping well. She has never been aggressive or violent toward others or property, nor verbalized thoughts of such behavior.     The patient went to an appointment during [place] week at [facility], and was sent from there to [facility] with anxiety. The informant was told the patient was being held for the flu, which the informant did not beleive she had. She was held for 2 days, and was seen by psychiatrists there during that time.     The informant believes that patient is safe for discharge home, and believes whatever statements the patient made today resulting to the the presentation to the [facility] ED may be an effort on the part of the patient to get attention from her boyfriend, Enrique. The patient will follow up with her providers at [facility]. She arranged a ride home with a friend.

## 2019-01-16 NOTE — ED ADULT NURSE NOTE - NSIMPLEMENTINTERV_GEN_ALL_ED
Implemented All Universal Safety Interventions:  [place] to call system. Call bell, personal items and telephone within reach. Instruct patient to call for assistance. Room bathroom lighting operational. Non-slip footwear when patient is off stretcher. Physically safe environment: no spills, clutter or unnecessary equipment. Stretcher in lowest position, wheels locked, appropriate side rails in place.

## 2019-01-16 NOTE — ED PROVIDER NOTE - OBJECTIVE STATEMENT
42 y/o female patient sent over from [facility] for cutting and SI.  Pt with very minor superficial cuts to her left arm from yesterday with a pocket knife.  PT unsure of when her last tetanus shot was.  PT c/o SI with no plan.  Pt denies HI/AH/VH.  PT with no c/o pain or discomfort.

## 2019-01-16 NOTE — ED BEHAVIORAL HEALTH ASSESSMENT NOTE - CURRENT MEDICATION
Lexapro 10 mg po daily, Lithium 300 mg po daily, Lithium 600 mg po QHS, Neurontin 600 mg po BID, Trazodone 200 mg po QHS, Hydroxyzine 25 mg po QHS prn insomnia

## 2019-01-16 NOTE — ED BEHAVIORAL HEALTH ASSESSMENT NOTE - HPI (INCLUDE ILLNESS QUALITY, SEVERITY, DURATION, TIMING, CONTEXT, MODIFYING FACTORS, ASSOCIATED SIGNS AND SYMPTOMS)
42 y/o  F,  x3, noncaregiver to 2 sons (age 21 and 16, minor lives with his bio father), employed, lives with parents, with charted diagnoses of Borderline PD, r/o Bipolar II Disorder, cannabis use disorder, alcohol use disorder, r/o post-traumatic stress disorder, 4 prior hospitalizations (most recently March 2018 at [facility]), one reported suicide attempt at age 16 by slicing wrists, no hx of violence/legal problems, PMhx significant for migraine HA, BIBEMS activated by [facility] staff for cutting and SI in context of recent breakup with boyfriend.     Per today's documentation from CHRISTOS Prabhakar, at [facility]: "Pt came into individual therapy session and reporting engaging in self-harm the past three days. Pt reported   racing thoughts, auditory hallucinations telling her to "kill" herself, and expressed suicidal ideation."   On assessment, pt is calm, cooperative, and in good behavioral control. Pt reports feeling depressed and anxious since boyfriend broke up with her 3 days ago (Sunday). Pt reports some difficulty sleeping the past few night, difficulty concentrating, and ruminating. She denies anhedonia, denies hopelessness. She reports fair appetite. She reports superficially cutting her left forearm each of the past 3 days to relieve tension. She denies trying to kill herself. Note: pt's entire left forearm has scars from history of self-harm.   When asked about AH, pt states the "voices" are actually her own thoughts. She denies any AH. Pt reports intermittent suicidal ideation over the past 3 days, but she denies plan or intent. Pt currently denies suicidal ideation, intent, or plan. She cites her children as protective factors. States she would tell her mother, call 911, or return to ED if she develops urge to harm self or others.   Pt denies homicidal or violent ideation, intent, or plan. She denies manic or psychotic symptoms. She reports medication compliance, denies recent substance use.   See  note for collateral obtained from mother.

## 2019-01-16 NOTE — ED BEHAVIORAL HEALTH ASSESSMENT NOTE - SUMMARY
44 y/o  F,  x3, noncaregiver to 2 sons (age 21 and 16, minor lives with his bio father), employed, lives with parents, with charted diagnoses of Borderline PD, r/o Bipolar II Disorder, cannabis use disorder, alcohol use disorder, r/o post-traumatic stress disorder, 4 prior hospitalizations (most recently March 2018 at [facility]), one reported suicide attempt at age 16 by slicing wrists, no hx of violence/legal problems, PMhx significant for migraine HA, BIBEMS activated by [facility] staff for cutting and SI in context of recent breakup with boyfriend.    Patient currently denies suicidal ideation, intent, or plan. She identifies reasons for living and engages in safety planning. She denies homicidal or violent ideation, intent, or plan. She does not meet criteria for a major depressive episode. She does not appear manic or psychotic. Pt does not present an acute danger to self or others and does not meet criteria for involuntary psychiatric admission at this time. She declines voluntary psychiatric admission at this time.
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
## 2019-01-16 NOTE — ED PROVIDER NOTE - MEDICAL DECISION MAKING DETAILS
44 y/o female patient sent over from [facility] for cutting and SI. 42 y/o female patient sent over from [facility] for cutting and SI.  Physical examination performed.  No clinical evidence of intoxication or any acute medical issues/problems requiring immediate interventions.  Psychiatric consultation requested and patient cleared for safe discharge .

## 2019-03-23 ENCOUNTER — RESULT REVIEW (OUTPATIENT)
Age: 44
End: 2019-03-23

## 2019-05-08 NOTE — ED BEHAVIORAL HEALTH ASSESSMENT NOTE - DESCRIPTION
The IOP is in the target range. calm, cooperative, in good behavioral control     Vital Signs Last 24 Hrs  T(C): 37.4 (16 Jan 2019 17:34), Max: 37.4 (16 Jan 2019 17:34)  T(F): 99.4 (16 Jan 2019 17:34), Max: 99.4 (16 Jan 2019 17:34)  HR: 71 (16 Jan 2019 17:34) (71 - 71)  BP: 131/80 (16 Jan 2019 17:34) (131/80 - 131/80)  BP(mean): --  RR: 16 (16 Jan 2019 17:34) (16 - 16)  SpO2: 100% (16 Jan 2019 17:34) (100% - 100%) see HPI migraines

## 2019-08-02 ENCOUNTER — OUTPATIENT (OUTPATIENT)
Dept: OUTPATIENT SERVICES | Facility: HOSPITAL | Age: 44
LOS: 1 days | Discharge: HOME | End: 2019-08-02

## 2019-08-02 DIAGNOSIS — Z98.82 BREAST IMPLANT STATUS: Chronic | ICD-10-CM

## 2019-08-02 DIAGNOSIS — Z90.49 ACQUIRED ABSENCE OF OTHER SPECIFIED PARTS OF DIGESTIVE TRACT: Chronic | ICD-10-CM

## 2019-08-05 DIAGNOSIS — F12.20 CANNABIS DEPENDENCE, UNCOMPLICATED: ICD-10-CM

## 2019-10-16 ENCOUNTER — TRANSCRIPTION ENCOUNTER (OUTPATIENT)
Age: 44
End: 2019-10-16

## 2019-10-16 ENCOUNTER — INPATIENT (INPATIENT)
Facility: HOSPITAL | Age: 44
LOS: 0 days | Discharge: HOME | DRG: 92 | End: 2019-10-17
Attending: INTERNAL MEDICINE | Admitting: INTERNAL MEDICINE
Payer: MEDICAID

## 2019-10-16 VITALS
RESPIRATION RATE: 16 BRPM | SYSTOLIC BLOOD PRESSURE: 122 MMHG | TEMPERATURE: 98 F | HEIGHT: 62 IN | WEIGHT: 113.98 LBS | HEART RATE: 92 BPM | DIASTOLIC BLOOD PRESSURE: 77 MMHG | OXYGEN SATURATION: 98 %

## 2019-10-16 DIAGNOSIS — D72.829 ELEVATED WHITE BLOOD CELL COUNT, UNSPECIFIED: ICD-10-CM

## 2019-10-16 DIAGNOSIS — Z29.9 ENCOUNTER FOR PROPHYLACTIC MEASURES, UNSPECIFIED: ICD-10-CM

## 2019-10-16 DIAGNOSIS — Z98.82 BREAST IMPLANT STATUS: Chronic | ICD-10-CM

## 2019-10-16 DIAGNOSIS — E03.9 HYPOTHYROIDISM, UNSPECIFIED: ICD-10-CM

## 2019-10-16 DIAGNOSIS — T56.891A TOXIC EFFECT OF OTHER METALS, ACCIDENTAL (UNINTENTIONAL), INITIAL ENCOUNTER: ICD-10-CM

## 2019-10-16 DIAGNOSIS — Z90.49 ACQUIRED ABSENCE OF OTHER SPECIFIED PARTS OF DIGESTIVE TRACT: Chronic | ICD-10-CM

## 2019-10-16 DIAGNOSIS — F41.9 ANXIETY DISORDER, UNSPECIFIED: ICD-10-CM

## 2019-10-16 DIAGNOSIS — F32.9 MAJOR DEPRESSIVE DISORDER, SINGLE EPISODE, UNSPECIFIED: ICD-10-CM

## 2019-10-16 LAB
ALBUMIN SERPL ELPH-MCNC: 4.2 G/DL — SIGNIFICANT CHANGE UP (ref 3.3–5)
ALP SERPL-CCNC: 105 U/L — SIGNIFICANT CHANGE UP (ref 40–120)
ALT FLD-CCNC: 21 U/L — SIGNIFICANT CHANGE UP (ref 12–78)
ANION GAP SERPL CALC-SCNC: 5 MMOL/L — SIGNIFICANT CHANGE UP (ref 5–17)
APAP SERPL-MCNC: <2 UG/ML — LOW (ref 10–30)
AST SERPL-CCNC: 16 U/L — SIGNIFICANT CHANGE UP (ref 15–37)
BASOPHILS # BLD AUTO: 0.05 K/UL — SIGNIFICANT CHANGE UP (ref 0–0.2)
BASOPHILS NFR BLD AUTO: 0.3 % — SIGNIFICANT CHANGE UP (ref 0–2)
BILIRUB SERPL-MCNC: 0.4 MG/DL — SIGNIFICANT CHANGE UP (ref 0.2–1.2)
BUN SERPL-MCNC: 23 MG/DL — SIGNIFICANT CHANGE UP (ref 7–23)
CALCIUM SERPL-MCNC: 10.2 MG/DL — HIGH (ref 8.5–10.1)
CHLORIDE SERPL-SCNC: 102 MMOL/L — SIGNIFICANT CHANGE UP (ref 96–108)
CO2 SERPL-SCNC: 26 MMOL/L — SIGNIFICANT CHANGE UP (ref 22–31)
CREAT SERPL-MCNC: 0.98 MG/DL — SIGNIFICANT CHANGE UP (ref 0.5–1.3)
EOSINOPHIL # BLD AUTO: 0.27 K/UL — SIGNIFICANT CHANGE UP (ref 0–0.5)
EOSINOPHIL NFR BLD AUTO: 1.8 % — SIGNIFICANT CHANGE UP (ref 0–6)
ETHANOL SERPL-MCNC: <10 MG/DL — SIGNIFICANT CHANGE UP (ref 0–10)
GLUCOSE SERPL-MCNC: 93 MG/DL — SIGNIFICANT CHANGE UP (ref 70–99)
HCG SERPL-ACNC: <1 MIU/ML — SIGNIFICANT CHANGE UP
HCT VFR BLD CALC: 40.6 % — SIGNIFICANT CHANGE UP (ref 34.5–45)
HGB BLD-MCNC: 13.2 G/DL — SIGNIFICANT CHANGE UP (ref 11.5–15.5)
IMM GRANULOCYTES NFR BLD AUTO: 0.5 % — SIGNIFICANT CHANGE UP (ref 0–1.5)
LITHIUM SERPL-MCNC: 2 MMOL/L — CRITICAL HIGH (ref 0.6–1.2)
LYMPHOCYTES # BLD AUTO: 1.8 K/UL — SIGNIFICANT CHANGE UP (ref 1–3.3)
LYMPHOCYTES # BLD AUTO: 12.3 % — LOW (ref 13–44)
MCHC RBC-ENTMCNC: 27.8 PG — SIGNIFICANT CHANGE UP (ref 27–34)
MCHC RBC-ENTMCNC: 32.5 GM/DL — SIGNIFICANT CHANGE UP (ref 32–36)
MCV RBC AUTO: 85.7 FL — SIGNIFICANT CHANGE UP (ref 80–100)
MONOCYTES # BLD AUTO: 0.75 K/UL — SIGNIFICANT CHANGE UP (ref 0–0.9)
MONOCYTES NFR BLD AUTO: 5.1 % — SIGNIFICANT CHANGE UP (ref 2–14)
NEUTROPHILS # BLD AUTO: 11.69 K/UL — HIGH (ref 1.8–7.4)
NEUTROPHILS NFR BLD AUTO: 80 % — HIGH (ref 43–77)
NRBC # BLD: 0 /100 WBCS — SIGNIFICANT CHANGE UP (ref 0–0)
PCP SPEC-MCNC: SIGNIFICANT CHANGE UP
PLATELET # BLD AUTO: 366 K/UL — SIGNIFICANT CHANGE UP (ref 150–400)
POTASSIUM SERPL-MCNC: 4.5 MMOL/L — SIGNIFICANT CHANGE UP (ref 3.5–5.3)
POTASSIUM SERPL-SCNC: 4.5 MMOL/L — SIGNIFICANT CHANGE UP (ref 3.5–5.3)
PROT SERPL-MCNC: 8.3 G/DL — SIGNIFICANT CHANGE UP (ref 6–8.3)
RBC # BLD: 4.74 M/UL — SIGNIFICANT CHANGE UP (ref 3.8–5.2)
RBC # FLD: 14.8 % — HIGH (ref 10.3–14.5)
SALICYLATES SERPL-MCNC: <1.7 MG/DL — LOW (ref 2.8–20)
SODIUM SERPL-SCNC: 133 MMOL/L — LOW (ref 135–145)
TSH SERPL-MCNC: 3.14 UIU/ML — SIGNIFICANT CHANGE UP (ref 0.36–3.74)
WBC # BLD: 14.64 K/UL — HIGH (ref 3.8–10.5)
WBC # FLD AUTO: 14.64 K/UL — HIGH (ref 3.8–10.5)

## 2019-10-16 PROCEDURE — 99283 EMERGENCY DEPT VISIT LOW MDM: CPT

## 2019-10-16 PROCEDURE — 93010 ELECTROCARDIOGRAM REPORT: CPT

## 2019-10-16 PROCEDURE — 71046 X-RAY EXAM CHEST 2 VIEWS: CPT | Mod: 26

## 2019-10-16 RX ORDER — LEVOTHYROXINE SODIUM 125 MCG
112 TABLET ORAL DAILY
Refills: 0 | Status: DISCONTINUED | OUTPATIENT
Start: 2019-10-16 | End: 2019-10-17

## 2019-10-16 RX ORDER — TRAZODONE HCL 50 MG
100 TABLET ORAL AT BEDTIME
Refills: 0 | Status: DISCONTINUED | OUTPATIENT
Start: 2019-10-16 | End: 2019-10-17

## 2019-10-16 RX ORDER — SODIUM CHLORIDE 9 MG/ML
1000 INJECTION INTRAMUSCULAR; INTRAVENOUS; SUBCUTANEOUS
Refills: 0 | Status: DISCONTINUED | OUTPATIENT
Start: 2019-10-16 | End: 2019-10-17

## 2019-10-16 RX ORDER — SODIUM CHLORIDE 9 MG/ML
1000 INJECTION INTRAMUSCULAR; INTRAVENOUS; SUBCUTANEOUS ONCE
Refills: 0 | Status: COMPLETED | OUTPATIENT
Start: 2019-10-16 | End: 2019-10-16

## 2019-10-16 RX ADMIN — Medication 1 MILLIGRAM(S): at 19:22

## 2019-10-16 RX ADMIN — SODIUM CHLORIDE 1000 MILLILITER(S): 9 INJECTION INTRAMUSCULAR; INTRAVENOUS; SUBCUTANEOUS at 19:21

## 2019-10-16 RX ADMIN — SODIUM CHLORIDE 1000 MILLILITER(S): 9 INJECTION INTRAMUSCULAR; INTRAVENOUS; SUBCUTANEOUS at 20:45

## 2019-10-16 NOTE — H&P ADULT - NEGATIVE NEUROLOGICAL SYMPTOMS
no weakness/no paresthesias/no syncope/no vertigo/no difficulty walking no weakness/no paresthesias/no syncope/no vertigo

## 2019-10-16 NOTE — H&P ADULT - NSHPSOCIALHISTORY_GEN_ALL_CORE
Lives at home   2 childreen  Denies ETOH , NO Smoking, No Drugs in any form Lives at home   2 children  Denies ETOH , NO Smoking, No Drugs in any form

## 2019-10-16 NOTE — H&P ADULT - NEGATIVE GENERAL SYMPTOMS
termers/no fever/no chills/no anorexia no fever/no chills/no anorexia no fever/no chills/no anorexia/no polyuria/no polydipsia

## 2019-10-16 NOTE — H&P ADULT - GASTROINTESTINAL DETAILS
normal/soft/nontender/no distention/no masses palpable/bowel sounds normal/no bruit/no rigidity/no organomegaly

## 2019-10-16 NOTE — ED ADULT NURSE NOTE - PMH
Alcohol abuse    Anxiety    Hypothyroid    Migraine    Severe episode of recurrent major depressive disorder, with psychotic features Alcohol abuse    Anxiety    Depression    Hypothyroid    Migraine    Severe episode of recurrent major depressive disorder, with psychotic features

## 2019-10-16 NOTE — ED PROVIDER NOTE - ATTENDING CONTRIBUTION TO CARE
I have personally performed a face to face diagnostic evaluation on this patient.  I have reviewed the PA note and agree with the history, exam, and plan of care, except as noted.  History and Exam by me shows patient presents to ER with mother c/o tremors, has had in the past, feels its getting worse, concerned her lithium levels may be elevated, patient in no acute distress, heart and lungs clear, abdomen soft, f/u labs, lithium level, drug screen, ekg.

## 2019-10-16 NOTE — H&P ADULT - NEUROLOGICAL COMMENTS
DTRs: +3 in left patellar, +2 in R patellar, +2 b/l biceps, +2 b/l triceps.  B/l upper extremity resting tremor that does not change with intention.

## 2019-10-16 NOTE — H&P ADULT - ATTENDING COMMENTS
Pt seen, examined, case & care plan d/w pt, residents at detail.  AM labs  Psych eval Pt seen, examined, case & care plan d/w pt, residents at detail.  AM labs  Psych eval- Dr Yadav

## 2019-10-16 NOTE — ED ADULT NURSE NOTE - OBJECTIVE STATEMENT
received pt in bed #8a Pt A&O c/o shaking & itching on/off x 2 weeks pt states she was in the psychiatric floor @ [facility] from 9/29-10/4.

## 2019-10-16 NOTE — H&P ADULT - ASSESSMENT
This is a 44 year old female with past medical history borderline personality disorder, bipolar disorder, anxiety/depression with previous SI, and hypothyroidism presenting to [facility] with tremors. Admit to telemetry for monitoring of lithium toxicity. This is a 44 year old female with past medical history borderline personality disorder, bipolar disorder, anxiety/depression with previous SI, and hypothyroidism presenting to [facility] with tremors. Admit to telemetry for monitoring of  high lithium.

## 2019-10-16 NOTE — H&P ADULT - NSHPREVIEWOFSYSTEMS_GEN_ALL_CORE
This is a 44 year old female with past medical history borderline personality disorder, bipolar disorder, anxiety/depression with previous SI, and hypothyroidism presenting to [facility] with tremors and itchiness and thinks her lithium level is too high.  Patient was recently discharged from [facility] on 10/4 and has had tremors in her upper extremities since before discharge. 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  Patient states she came in to [facility] today because the itching on chest was unbearable the night prior to admission.  Patient states she put chamomile lotion and did not get relief however patient denied itching at time of interview.  Patient is a poor historian, states she has a very poor memory and does not answer questions consistently.

## 2019-10-16 NOTE — H&P ADULT - NSICDXPASTMEDICALHX_GEN_ALL_CORE_FT
PAST MEDICAL HISTORY:  Alcohol abuse     Anxiety     Depression     Hypothyroid     Migraine     Severe episode of recurrent major depressive disorder, with psychotic features

## 2019-10-16 NOTE — H&P ADULT - HISTORY OF PRESENT ILLNESS
This is a 44 year old female with past medical history borderline personality disorder, bipolar disorder, anxiety/depression with previous SI, and hypothyroidism presenting to [facility] with tremors and itchiness and thinks her lithium level is too high.  Patient was recently discharged from [facility] on 10/4 and has had tremors in her upper extremities since before discharge. 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  Patient states she came in to [facility] today because the itching on chest was unbearable the night prior to admission.  Patient states she put chamomile lotion and did not get relief however patient denied itching at time of interview.  Patient is a poor historian, states she has a very poor memory and does not answer questions consistently.    In the ED, VS: T 97.6, HR 92, /77, RR 16, SpO2 98 on RA.  Labs significant for WBC 14.64, Na 133, Ca 10.2, Li 2.0, neg alcohol, drug screen positive for PCP and amphetamine.  EKG NSR. Chest xray: unofficial read without any acute infiltrative process.

## 2019-10-16 NOTE — H&P ADULT - RS GEN PE MLT RESP DETAILS PC
normal/breath sounds equal/good air movement/respirations non-labored/clear to auscultation bilaterally/no chest wall tenderness/no rales/no rhonchi/no wheezes

## 2019-10-16 NOTE — ED PROVIDER NOTE - OBJECTIVE STATEMENT
43 yo F PMHx bipolar disorder, anxiety/depression, hypothyroidism on multiple medication presents to ED stating: "I think my lithium level is high"- reports tremors and "fogginess"/difficulty concentrating. States symptoms are chronic but worsening over the last 2 days. Pt reports recent increase in lithium dosage. Denies other drug use. Denies alcohol use. States last drink >2-3 months ago. Denies SI/HI. Pt had recent inpatient psych admission to [facility]. Pt vague with details of hospital course. Pt denies HA, dizziness, chest pain, shortness of breath, fever/chills.

## 2019-10-16 NOTE — H&P ADULT - NSICDXFAMILYHX_GEN_ALL_CORE_FT
FAMILY HISTORY:  Sibling  Still living? Yes, Estimated age: Age Unknown  Family history of bipolar disorder, Age at diagnosis: Age Unknown

## 2019-10-16 NOTE — ED ADULT NURSE NOTE - NS TRANSFER PATIENT BELONGINGS
Past Medical History


Past Medical History:  High Cholesterol, Heart Disease, Hypothyroid, MI


Inés Disclaimer


Inés Disclaimer


This electronic medical record was generated, in whole or in part, using a 

voice recognition dictation system.





Departure


Departure


Referrals:  


YOAN GUERRA MD (PCP)


Scripts


Tramadol Hcl (TRAMADOL HCL) 50 Mg Tablet


50 MG PO BID PRN for PAIN, #15 TAB


   Prov: DEREK COREAS DO         8/20/18











DEREK COREAS DO Aug 20, 2018 06:35 Clothing

## 2019-10-16 NOTE — H&P ADULT - SKIN/BREAST COMMENTS
multiple acne/ like pimple on upper chest wall, face multiple acne/ like pimple on  mid upper chest wall, side  face

## 2019-10-16 NOTE — H&P ADULT - NSHPPHYSICALEXAM_GEN_ALL_CORE
Physical Exam:  General: Well developed, well nourished, NAD  HEENT: NC/AT, PERRL, with colored contacts in place, EOMI B/L, moist mucous membranes, no nystagmus  Neck: Supple, nontender, no masses  CV: RRR, +S1/S2, no murmurs, rubs or gallops  Respiratory: CTA B/L, No W/R/R  Abdominal: Soft, NT, ND +BSx4  Extremities: No C/C/E, + peripheral pulses  Neurology: AAOx3, CN II-XII intact, sensation intact, muscle strength: 5/5 B/L upper and lower extremities. DTRs: +3 in left patellar, +2 in R patellar, +2 b/l biceps, +2 b/l triceps.  B/l upper extremity resting tremor that does not change with intention.

## 2019-10-16 NOTE — ED PROVIDER NOTE - PMH
Alcohol abuse    Anxiety    Depression    Hypothyroid    Migraine    Severe episode of recurrent major depressive disorder, with psychotic features

## 2019-10-17 ENCOUNTER — TRANSCRIPTION ENCOUNTER (OUTPATIENT)
Age: 44
End: 2019-10-17

## 2019-10-17 VITALS
TEMPERATURE: 98 F | OXYGEN SATURATION: 100 % | DIASTOLIC BLOOD PRESSURE: 74 MMHG | RESPIRATION RATE: 17 BRPM | SYSTOLIC BLOOD PRESSURE: 109 MMHG | HEART RATE: 66 BPM

## 2019-10-17 DIAGNOSIS — T56.891A TOXIC EFFECT OF OTHER METALS, ACCIDENTAL (UNINTENTIONAL), INITIAL ENCOUNTER: ICD-10-CM

## 2019-10-17 DIAGNOSIS — R79.89 OTHER SPECIFIED ABNORMAL FINDINGS OF BLOOD CHEMISTRY: ICD-10-CM

## 2019-10-17 DIAGNOSIS — Z29.9 ENCOUNTER FOR PROPHYLACTIC MEASURES, UNSPECIFIED: ICD-10-CM

## 2019-10-17 LAB
ALBUMIN SERPL ELPH-MCNC: 3.4 G/DL — SIGNIFICANT CHANGE UP (ref 3.3–5)
ALP SERPL-CCNC: 90 U/L — SIGNIFICANT CHANGE UP (ref 40–120)
ALT FLD-CCNC: 18 U/L — SIGNIFICANT CHANGE UP (ref 12–78)
ANION GAP SERPL CALC-SCNC: 5 MMOL/L — SIGNIFICANT CHANGE UP (ref 5–17)
APPEARANCE UR: CLEAR — SIGNIFICANT CHANGE UP
AST SERPL-CCNC: 13 U/L — LOW (ref 15–37)
BACTERIA # UR AUTO: ABNORMAL
BILIRUB SERPL-MCNC: 0.3 MG/DL — SIGNIFICANT CHANGE UP (ref 0.2–1.2)
BILIRUB UR-MCNC: NEGATIVE — SIGNIFICANT CHANGE UP
BUN SERPL-MCNC: 15 MG/DL — SIGNIFICANT CHANGE UP (ref 7–23)
CALCIUM SERPL-MCNC: 8.7 MG/DL — SIGNIFICANT CHANGE UP (ref 8.5–10.1)
CHLORIDE SERPL-SCNC: 111 MMOL/L — HIGH (ref 96–108)
CO2 SERPL-SCNC: 24 MMOL/L — SIGNIFICANT CHANGE UP (ref 22–31)
COLOR SPEC: YELLOW — SIGNIFICANT CHANGE UP
CREAT SERPL-MCNC: 0.75 MG/DL — SIGNIFICANT CHANGE UP (ref 0.5–1.3)
DIFF PNL FLD: NEGATIVE — SIGNIFICANT CHANGE UP
EPI CELLS # UR: SIGNIFICANT CHANGE UP
GLUCOSE SERPL-MCNC: 96 MG/DL — SIGNIFICANT CHANGE UP (ref 70–99)
GLUCOSE UR QL: NEGATIVE — SIGNIFICANT CHANGE UP
HCT VFR BLD CALC: 36.6 % — SIGNIFICANT CHANGE UP (ref 34.5–45)
HGB BLD-MCNC: 11.7 G/DL — SIGNIFICANT CHANGE UP (ref 11.5–15.5)
KETONES UR-MCNC: NEGATIVE — SIGNIFICANT CHANGE UP
LEUKOCYTE ESTERASE UR-ACNC: ABNORMAL
LITHIUM SERPL-MCNC: 1.3 MMOL/L — HIGH (ref 0.6–1.2)
MCHC RBC-ENTMCNC: 28.1 PG — SIGNIFICANT CHANGE UP (ref 27–34)
MCHC RBC-ENTMCNC: 32 GM/DL — SIGNIFICANT CHANGE UP (ref 32–36)
MCV RBC AUTO: 87.8 FL — SIGNIFICANT CHANGE UP (ref 80–100)
NITRITE UR-MCNC: NEGATIVE — SIGNIFICANT CHANGE UP
NRBC # BLD: 0 /100 WBCS — SIGNIFICANT CHANGE UP (ref 0–0)
PH UR: 6.5 — SIGNIFICANT CHANGE UP (ref 5–8)
PLATELET # BLD AUTO: 299 K/UL — SIGNIFICANT CHANGE UP (ref 150–400)
POTASSIUM SERPL-MCNC: 4.1 MMOL/L — SIGNIFICANT CHANGE UP (ref 3.5–5.3)
POTASSIUM SERPL-SCNC: 4.1 MMOL/L — SIGNIFICANT CHANGE UP (ref 3.5–5.3)
PROT SERPL-MCNC: 6.8 G/DL — SIGNIFICANT CHANGE UP (ref 6–8.3)
PROT UR-MCNC: NEGATIVE — SIGNIFICANT CHANGE UP
RBC # BLD: 4.17 M/UL — SIGNIFICANT CHANGE UP (ref 3.8–5.2)
RBC # FLD: 15 % — HIGH (ref 10.3–14.5)
RBC CASTS # UR COMP ASSIST: SIGNIFICANT CHANGE UP /HPF (ref 0–4)
SODIUM SERPL-SCNC: 140 MMOL/L — SIGNIFICANT CHANGE UP (ref 135–145)
SP GR SPEC: 1.01 — SIGNIFICANT CHANGE UP (ref 1.01–1.02)
UROBILINOGEN FLD QL: NEGATIVE — SIGNIFICANT CHANGE UP
WBC # BLD: 10.64 K/UL — HIGH (ref 3.8–10.5)
WBC # FLD AUTO: 10.64 K/UL — HIGH (ref 3.8–10.5)
WBC UR QL: ABNORMAL

## 2019-10-17 RX ORDER — TRAZODONE HCL 50 MG
1 TABLET ORAL
Qty: 0 | Refills: 0 | DISCHARGE
Start: 2019-10-17

## 2019-10-17 RX ORDER — TRAZODONE HCL 50 MG
2 TABLET ORAL
Qty: 0 | Refills: 0 | DISCHARGE
Start: 2019-10-17

## 2019-10-17 RX ORDER — OXYMETAZOLINE HYDROCHLORIDE 0.5 MG/ML
2 SPRAY NASAL
Refills: 0 | Status: DISCONTINUED | OUTPATIENT
Start: 2019-10-17 | End: 2019-10-17

## 2019-10-17 RX ADMIN — OXYMETAZOLINE HYDROCHLORIDE 2 SPRAY(S): 0.5 SPRAY NASAL at 11:57

## 2019-10-17 RX ADMIN — SODIUM CHLORIDE 75 MILLILITER(S): 9 INJECTION INTRAMUSCULAR; INTRAVENOUS; SUBCUTANEOUS at 11:59

## 2019-10-17 RX ADMIN — Medication 112 MICROGRAM(S): at 05:32

## 2019-10-17 RX ADMIN — SODIUM CHLORIDE 75 MILLILITER(S): 9 INJECTION INTRAMUSCULAR; INTRAVENOUS; SUBCUTANEOUS at 00:42

## 2019-10-17 NOTE — DISCHARGE NOTE PROVIDER - CARE PROVIDER_API CALL
Rissa Andrade  [address] [place] [address]  Phone: [phone]  Fax: (   )    -  Follow Up Time: 1 week

## 2019-10-17 NOTE — DISCHARGE NOTE PROVIDER - HOSPITAL COURSE
This is a 44 year old female with past medical history borderline personality disorder, bipolar disorder, anxiety/depression with previous SI, and hypothyroidism presenting to [facility] with tremors and itchiness and thinks her lithium level is too high.  Patient was recently discharged from [facility] on 10/4 and has had tremors in her upper extremities since before discharge. 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  Patient states she came in to [place] today because the itching on chest was unbearable the night prior to admission.  Patient states she put chamomile lotion and did not get relief however patient denied itching at time of interview.  Patient is a poor historian, states she has a very poor memory and does not answer questions consistently.        In the ED, VS: T 97.6, HR 92, /77, RR 16, SpO2 98 on RA.  Labs significant for WBC 14.64, Na 133, Ca 10.2, Li 2.0, neg alcohol, drug screen positive for PCP and amphetamine.  EKG NSR. Chest xray: without any acute infiltrative process.         Pt admitted for lithium toxicity. IV fluids started and lithium held. Over the course of her stay pt's tremors subjectively improved along with concurrent lithium blood level reduction from 2 down to 1.3. Consulted psych Dr. Yadav who performed psych evaluation ruled out suicidal ideation, major depressive episode, current kiara, pshycosis, deemed pt does not meet criteria for psychiatric admission at this time.        Pt safe to discharge home today. This is a 44 year old female with past medical history borderline personality disorder, bipolar disorder, anxiety/depression with previous SI, and hypothyroidism presenting to [facility] with tremors and itchiness and thinks her lithium level is too high.  Patient was recently discharged from [facility] on 10/4/19 and has had tremors in her upper extremities since before discharge. 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  Patient states she came in to [place] today because she was told by her doctor to the  go to ER to check lithium level & also itching on chest was unbearable the night prior to admission. Pt has had the rash prior to her [facility] hospitalization & was seen by dermatologist there,  Patient states she put calamine  lotion and did not get relief however patient denied itching at time of interview.  Patient is a poor historian, states she has a very poor memory and does not answer questions consistently. Pt's mother is NOT able to give detail information as she does NOT know much about pt's history.        In the ED, VS: T 97.6, HR 92, /77, RR 16, SpO2 98 on RA.  Labs significant for WBC 14.64, Na 133, Ca 10.2, Li 2.0, neg alcohol, drug screen positive for PCP and amphetamine.  EKG NSR. Chest xray: without any acute infiltrative process.         Pt admitted for R/O lithium toxicity. IV fluids started and lithium held. Over the course of her stay pt's tremors subjectively improved along with concurrent lithium blood level reduction from 2 down to 1.3. Consulted psych Dr. Yadav who performed psych evaluation ruled out suicidal ideation, major depressive episode, current kiara, pshycosis, deemed pt does not meet criteria for psychiatric admission at this time. pt stable for d/c as per DR Yadav with out pt follow up with her Psych MD on Monday at [facility].        Pt safe to discharge home today. This is a 44 year old female with past medical history borderline personality disorder, bipolar disorder, anxiety/depression with previous SI, and hypothyroidism presenting to [facility] with tremors and itchiness and thinks her lithium level is too high.  Patient was recently discharged from [facility] on 10/4/19 and has had tremors in her upper extremities since before discharge. 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  Patient states she came in to [place] today because she was told by her doctor to the  go to ER to check lithium level & also itching on chest was unbearable the night prior to admission. Pt has had the rash prior to her [facility] hospitalization & was seen by dermatologist there,  Patient states she put calamine  lotion and did not get relief however patient denied itching at time of interview.  Patient is a poor historian, states she has a very poor memory and does not answer questions consistently. Pt's mother is NOT able to give detail information as she does NOT know much about pt's history.        In the ED, VS: T 97.6, HR 92, /77, RR 16, SpO2 98 on RA.  Labs significant for WBC 14.64, Na 133, Ca 10.2, Li 2.0, neg alcohol, drug screen positive for PCP and amphetamine.  EKG NSR. Chest xray: without any acute infiltrative process.         Pt admitted for R/O lithium toxicity. IV fluids started and lithium held. Over the course of her stay pt's tremors subjectively improved along with concurrent lithium blood level reduction from 2 down to 1.3. Consulted psych Dr. Yadav who performed psych evaluation ruled out suicidal ideation, major depressive episode, current kiara, pshycosis, deemed pt does not meet criteria for psychiatric admission at this time. pt stable for d/c as per DR Yadav with out pt follow up with her Psych MD on Monday at [facility]. U Tox positive for PCP & Amphetamine, pt denies any kind of Drugs or substance.        Pt safe to discharge home today.
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
## 2019-10-17 NOTE — BEHAVIORAL HEALTH ASSESSMENT NOTE - RISK ASSESSMENT
Moderate Acute Suicide Risk Due to patient's drug use, chaotic personal situation and borderline personality disorder

## 2019-10-17 NOTE — DISCHARGE NOTE PROVIDER - PROVIDER TOKENS
FREE:[LAST:[Raymond],FIRST:[Rissa],PHONE:[phone]],FAX:[(   [phone]    -],ADDRESS:[[address] [place] [address]],FOLLOWUP:[1 week]]

## 2019-10-17 NOTE — PROGRESS NOTE ADULT - ASSESSMENT
This is a 44 year old female with past medical history borderline personality disorder, bipolar disorder, anxiety/depression with previous SI, and hypothyroidism presenting to [facility] with tremors. Admit to telemetry for monitoring of lithium toxicity.

## 2019-10-17 NOTE — DISCHARGE NOTE PROVIDER - NSDCCPCAREPLAN_GEN_ALL_CORE_FT
PRINCIPAL DISCHARGE DIAGNOSIS  Diagnosis: Lithium toxicity  Assessment and Plan of Treatment: You had lithium toxicity 9too much lithium in your blood). We gave you fluids and held your lithium during your stay. You should resume taking lithium tomorrow anddo nto take your lithium today.Please continue your home medications we sent to the pharmacy, follow with your primary care doctor withing 1 week, and your pshyciatrist withing 1 week for further management of your lithium dosing.      SECONDARY DISCHARGE DIAGNOSES  Diagnosis: Anxiety  Assessment and Plan of Treatment: please continue your home medications, see your psychiatrist within 1 week    Diagnosis: Hypothyroid  Assessment and Plan of Treatment: please continue your home medications, see your primary care doctor within 1 week    Diagnosis: Depression  Assessment and Plan of Treatment: please continue your home medications, see your psychiatrist within 1 week PRINCIPAL DISCHARGE DIAGNOSIS  Diagnosis: Lithium toxicity  Assessment and Plan of Treatment: You had lithium toxicity (too much lithium in your blood). We gave you fluids and held your lithium during your stay. You should resume taking lithium 600 mg at night tomorrow. STOP taking the 300mg dose in the morning. Do not take any lithium today. Please continue your home medications and your pshyciatrist within 1 week for further management of your lithium dosing.      SECONDARY DISCHARGE DIAGNOSES  Diagnosis: Anxiety  Assessment and Plan of Treatment: please continue your home medications, see your psychiatrist within 1 week    Diagnosis: Hypothyroid  Assessment and Plan of Treatment: please continue your home medications, see your psychiatrist within 1 week    Diagnosis: Depression  Assessment and Plan of Treatment: please continue your home medications, see your psychiatrist within 1 week PRINCIPAL DISCHARGE DIAGNOSIS  Diagnosis: Elevated lithium level  Assessment and Plan of Treatment: In ER You had elevated  blood Lithium level of 2.0---> 1.3 today  -We gave you fluids and held your lithium during your stay.  - You should resume taking lithium 600 mg at night tomorrow.   - STOP taking theLithium 300mg dose in the MORNING   - Do not take any lithium today.   -Follow up with your pshyciatrist on MONDAY next week for further management of your lithium dosing.      SECONDARY DISCHARGE DIAGNOSES  Diagnosis: Hypothyroid  Assessment and Plan of Treatment: please continue your Synthroid 112 mcg daily , see your Endocrnologist within 1- 2 week    Diagnosis: Anxiety  Assessment and Plan of Treatment: please continue your home medications, see your psychiatrist within 1 week, Buspar 7.5 mg 3x day    Diagnosis: Depression  Assessment and Plan of Treatment: please continue your home medications, see your psychiatrist within 1 week  Continue Trazadone 100 mg 1 tab at bed time daily for Insomnia PRINCIPAL DISCHARGE DIAGNOSIS  Diagnosis: Elevated lithium level  Assessment and Plan of Treatment: In ER You had elevated  blood Lithium level of 2.0---> 1.3 today  -We gave you fluids and held your lithium during your stay.  - You should resume taking lithium 600 mg at night tomorrow.   - STOP taking theLithium 300mg dose in the MORNING   - Do not take any lithium today.   -Follow up with your pshyciatrist on MONDAY next week for further management of your lithium dosing.  -DO NOT use any recreational Drugs/substance.      SECONDARY DISCHARGE DIAGNOSES  Diagnosis: Hypothyroid  Assessment and Plan of Treatment: please continue your Synthroid 112 mcg daily , see your Endocrnologist within 1- 2 week    Diagnosis: Anxiety  Assessment and Plan of Treatment: please continue your home medications, see your psychiatrist within 1 week, Buspar 7.5 mg 3x day    Diagnosis: Depression  Assessment and Plan of Treatment: please continue your home medications, see your psychiatrist within 1 week  Continue Trazadone 100 mg 1 tab at bed time daily for Insomnia

## 2019-10-17 NOTE — PROGRESS NOTE ADULT - PROBLEM SELECTOR PLAN 1
-improving, litium blood level 2--> 1.3, subjectively decreased tremors   -cont to HOLD Lithium, IV NS @ 60 cc/hr  -Dr Yadav Psych Eval  -Mild Hypercalcemia resolved, mild Hyponatremia resolved -improving, litium blood level 2--> 1.3, subjectively decreased tremors   -cont to HOLD Lithium, IV NS @ 60 cc/hr  -Dr Leif Newman d/w at detail.-Restart Lithium 600 mg Q HS , stop AM lithium as per psych, follow up with your Psych MD on next week MONDAY   -Mild Hypercalcemia resolved, mild Hyponatremia resolved

## 2019-10-17 NOTE — DISCHARGE NOTE NURSING/CASE MANAGEMENT/SOCIAL WORK - NSDPACMPNY_GEN_ALL_CORE
You can access the [facility], offered by [facility], by registering with the following website: [email or web address]

## 2019-10-17 NOTE — PROGRESS NOTE ADULT - SUBJECTIVE AND OBJECTIVE BOX
Patient is a 44y old  Female who presents with a chief complaint of My Lithium level may be high (16 Oct 2019 22:33)    HPI:  This is a 44 year old female with past medical history borderline personality disorder, bipolar disorder, anxiety/depression with previous SI, and hypothyroidism presenting to [facility] with tremors and itchiness and thinks her lithium level is too high.  Patient was recently discharged from [facility] on 10/4 and has had tremors in her upper extremities since before discharge. 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  Patient states she came in to [place] today because the itching on chest was unbearable the night prior to admission.  Patient states she put chamomile lotion and did not get relief however patient denied itching at time of interview.  Patient is a poor historian, states she has a very poor memory and does not answer questions consistently.    In the ED, VS: T 97.6, HR 92, /77, RR 16, SpO2 98 on RA.  Labs significant for WBC 14.64, Na 133, Ca 10.2, Li 2.0, neg alcohol, drug screen positive for PCP and amphetamine.  EKG NSR. Chest xray: unofficial read without any acute infiltrative process. (16 Oct 2019 22:33)    INTERVAL HPI:  No acute events overnight. Pt states she is less shaky today, however her both hands and right foot still are shaking. Pt states no longer nauseated or feeling weak in comparison to yesterday. Pt denies agitation, ataxia, chest pain, shortness of breath, abdominal pain, headache, dizziness.     OVERNIGHT EVENTS:  None.     Home Medications:  BuSpar: 7.5 milligram(s) orally 3 times a day (16 Oct 2019 23:10)  lithium 300 mg oral capsule: 1 cap(s) orally once a day -Mood (16 Oct 2019 23:10)  lithium 600 mg oral capsule: 1 tab(s) orally once a day (at bedtime) (16 Oct 2019 23:10)  spironolactone 50 mg oral tablet: 1 tab(s) orally 2 times a day (16 Oct 2019 23:10)  Synthroid 112 mcg (0.112 mg) oral tablet: 1 tab(s) orally once a day (16 Oct 2019 23:10)      MEDICATIONS  (STANDING):  busPIRone 7.5 milliGRAM(s) Oral three times a day  levothyroxine 112 MICROGram(s) Oral daily  oxymetazoline 0.05% Nasal Spray 2 Spray(s) Both Nostrils two times a day  sodium chloride 0.9%. 1000 milliLiter(s) (75 mL/Hr) IV Continuous <Continuous>  traZODone 100 milliGRAM(s) Oral at bedtime    MEDICATIONS  (PRN):      Allergies    Percocet 10/325 (Flushing; Faint)    Intolerances      REVIEW OF SYSTEMS:  CONSTITUTIONAL: No fever, No chills, No fatigue, No myalgia, No Body ache, No Weakness  EYES: No eye pain,  No visual disturbances, No discharge, No Redness  ENMT: No ear pain, No nose bleed, No vertigo; No throat pain, +Congestion  NECK: No pain, No stiffness  RESPIRATORY: No cough, No wheezing, No hemoptysis, No shortness of breath  CARDIOVASCULAR: No chest pain, palpitations  GASTROINTESTINAL: No abdominal pain. No nausea, No vomiting, No diarrhea or constipation; [ x ] BM  GENITOURINARY: No dysuria, No frequency, No urgency, No hematuria or incontinence  NEUROLOGICAL: +tremor b/l UE, and RLE, No headaches, No dizziness, No numbness, No tingling, No weakness  EXT: No Swelling, No Pain, No Edema  SKIN: [ x ] No itching, burning, rashes, or lesions   MUSCULOSKELETAL: No joint pain or swelling; No muscle pain, No back pain, No extremity pain  PAIN SCALE: [ x ] None  [  ] Other-  REST OF REVIEW OF SYSTEMS: [ x ] Normal     Vital Signs Last 24 Hrs  T(C): 36.7 (17 Oct 2019 11:31), Max: 36.9 (17 Oct 2019 01:10)  T(F): 98.1 (17 Oct 2019 11:31), Max: 98.5 (17 Oct 2019 01:10)  HR: 66 (17 Oct 2019 11:31) (66 - 92)  BP: 109/74 (17 Oct 2019 11:31) (108/74 - 124/85)  BP(mean): --  RR: 17 (17 Oct 2019 11:31) (16 - 18)  SpO2: 100% (17 Oct 2019 11:31) (98% - 100%)    Finger Stick        10-16 @ 07:01  -  10-17 @ 07:00  --------------------------------------------------------  IN: 600 mL / OUT: 0 mL / NET: 600 mL    10-17 @ :01  -  10-17 @ 12:14  --------------------------------------------------------  IN: 375 mL / OUT: 0 mL / NET: 375 mL      PHYSICAL EXAM:  GENERAL:  [ x ] NAD, [ x ] Well appearing, lying upright in bed [  ] Agitated, [  ] Drowsy, [  ] Lethargy, [  ] Confused   HEAD:  [x  ] Normal, [  ] Other  EYES:  [x  ] EOMI, [ x ] PERRLA, [x  ] Conjunctiva and sclera clear normal, [  ] Other, [  ] Pallor, [  ] Discharge  ENMT:  [ x ] Normal, [ x ] Moist mucous membranes, [  ] Good dentition, [ x ] No thrush  NECK:  [ x ] Supple, [ x ] No JVD, [  ] Normal thyroid, [  ] Lymphadenopathy, [  ] Other  CHEST/LUNG:  [ x ] Clear to auscultation bilaterally, [ x ] Breath Sounds equal, [ x ] No rales, [ x ] No rhonchi, [ x ] No wheezing  HEART:  [ x ] Regular rate and rhythm, [  ] Tachycardia, [  ] Bradycardia, [  ] Irregular, [  ] No murmurs, No rubs, No gallops, [  ] PPM in place (Mfr:  )  ABDOMEN:  [ x ] Soft, [ x ] Nontender, [ x ] Nondistended, [ x ] No mass, [ x ] Bowel sounds present, [ x ] flat abdomen   NERVOUS SYSTEM:  [ x ] Alert & Oriented x3, [ x ] Nonfocal, [  ] Confusion, [  ] Encephalopathic, [  ] Sedated, [  ] Unable to assess, [  ] Other-  EXTREMITIES:  [ x ] 2+ Peripheral Pulses, No clubbing, No cyanosis,  [ x ] no edema  [  ] PVD stasis skin changes B/L lower EXT  LYMPH:  No lymphadenopathy noted  SKIN:  [ x ] No rashes or lesions, [  ] Pressure ulcers, [  ] Ecchymosis, [  ] Skin tears, [  ] Other    DIET: regular diet     LABS:                        11.7   10.64 )-----------( 299      ( 17 Oct 2019 06:25 )             36.6     17 Oct 2019 06:25    140    |  111    |  15     ----------------------------<  96     4.1     |  24     |  0.75     Ca    8.7        17 Oct 2019 06:25    TPro  6.8    /  Alb  3.4    /  TBili  0.3    /  DBili  x      /  AST  13     /  ALT  18     /  AlkPhos  90     17 Oct 2019 06:25      Urinalysis Basic - ( 17 Oct 2019 00:43 )    Color: Yellow / Appearance: Clear / S.010 / pH: x  Gluc: x / Ketone: Negative  / Bili: Negative / Urobili: Negative   Blood: x / Protein: Negative / Nitrite: Negative   Leuk Esterase: Trace / RBC: 0-2 /HPF / WBC 26-50   Sq Epi: x / Non Sq Epi: Few / Bacteria: TNTC                 Anemia Panel:      Thyroid Panel:  Thyroid Stimulating Hormone, Serum: 3.14 uIU/mL (10-16-19 @ 19:26)                RADIOLOGY & ADDITIONAL TESTS:    HEALTH ISSUES - PROBLEM Dx:  Prophylactic measure: Prophylactic measure  Anxiety: Anxiety  Depression: Depression  Hypothyroid: Hypothyroid  Leukocytosis: Leukocytosis        Consultant(s) Notes Reviewed:  [ x ] YES     Care Discussed with [  ] Consultants, [ x ] Patient, [  ] Family, [  ] , [  ] Social Service, [  ] RN, [  ] Physical Therapy  DVT PPX: [  ] Lovenox, [  ] SC Heparin, [  ] Coumadin, [  ] Xarelto, [  ] Eliquis, [  ] Pradaxa, [  ] IV Heparin drip, [  ] SCD, [  ] Contraindication secondary to GI Bleed, [ x ] Ambulation  Advanced Directive: [ x ] None, [  ] DNR/DNI Patient is a 44y old  Female who presents with a chief complaint of My Lithium level may be high (16 Oct 2019 22:33)    HPI:  This is a 44 year old female with past medical history borderline personality disorder, bipolar disorder, anxiety/depression with previous SI, and hypothyroidism presenting to [facility] with tremors and itchiness and thinks her lithium level is too high.  Patient was recently discharged from [facility] on 10/4 and has had tremors in her upper extremities since before discharge. 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  Patient states she came in to [place] today because the itching on chest was unbearable the night prior to admission.  Patient states she put chamomile lotion and did not get relief however patient denied itching at time of interview.  Patient is a poor historian, states she has a very poor memory and does not answer questions consistently.    In the ED, VS: T 97.6, HR 92, /77, RR 16, SpO2 98 on RA.  Labs significant for WBC 14.64, Na 133, Ca 10.2, Li 2.0, neg alcohol, drug screen positive for PCP and amphetamine.  EKG NSR. Chest xray: unofficial read without any acute infiltrative process. (16 Oct 2019 22:33)    INTERVAL HPI:  No acute events overnight. Pt states she is less shaky today, however her both hands and right foot still are shaking. Pt states no longer nauseated or feeling weak in comparison to yesterday. Pt denies agitation, ataxia, chest pain, shortness of breath, abdominal pain, headache, dizziness. D/W pt at detail, about Positive Urine Tox for PCP & Amphetamine - as per pt she is on some kind of diet pills for weight loss,  Feels a lot better, wants to go home.    OVERNIGHT EVENTS:  None.     Home Medications:  BuSpar: 7.5 milligram(s) orally 3 times a day (16 Oct 2019 23:10)  lithium 300 mg oral capsule: 1 cap(s) orally once a day -Mood (16 Oct 2019 23:10)  lithium 600 mg oral capsule: 1 tab(s) orally once a day (at bedtime) (16 Oct 2019 23:10)  spironolactone 50 mg oral tablet: 1 tab(s) orally 2 times a day (16 Oct 2019 23:10)  Synthroid 112 mcg (0.112 mg) oral tablet: 1 tab(s) orally once a day (16 Oct 2019 23:10)      MEDICATIONS  (STANDING):  busPIRone 7.5 milliGRAM(s) Oral three times a day  levothyroxine 112 MICROGram(s) Oral daily  oxymetazoline 0.05% Nasal Spray 2 Spray(s) Both Nostrils two times a day  sodium chloride 0.9%. 1000 milliLiter(s) (75 mL/Hr) IV Continuous <Continuous>  traZODone 100 milliGRAM(s) Oral at bedtime    MEDICATIONS  (PRN):      Allergies    Percocet 10/325 (Flushing; Faint)    Intolerances      REVIEW OF SYSTEMS: i am OK  CONSTITUTIONAL: No fever, No chills, No fatigue, No myalgia, No Body ache, No Weakness  EYES: No eye pain,  No visual disturbances, No discharge, No Redness  ENMT: No ear pain, No nose bleed, No vertigo; No throat pain, +Congestion  NECK: No pain, No stiffness  RESPIRATORY: No cough, No wheezing, No hemoptysis, No shortness of breath  CARDIOVASCULAR: No chest pain, palpitations  GASTROINTESTINAL: No abdominal pain. No nausea, No vomiting, No diarrhea or constipation; [ x ] BM  GENITOURINARY: No dysuria, No frequency, No urgency, No hematuria or incontinence  NEUROLOGICAL: +tremor b/l UE, and RLE, No headaches, No dizziness, No numbness, No tingling, No weakness  EXT: No Swelling, No Pain, No Edema  SKIN: [ x ] No itching, burning, rashes, or lesions   MUSCULOSKELETAL: No joint pain or swelling; No muscle pain, No back pain, No extremity pain  PAIN SCALE: [ x ] None  [  ] Other-  REST OF REVIEW OF SYSTEMS: [ x ] Normal     Vital Signs Last 24 Hrs  T(C): 36.7 (17 Oct 2019 11:31), Max: 36.9 (17 Oct 2019 01:10)  T(F): 98.1 (17 Oct 2019 11:31), Max: 98.5 (17 Oct 2019 01:10)  HR: 66 (17 Oct 2019 11:31) (66 - 92)  BP: 109/74 (17 Oct 2019 11:31) (108/74 - 124/85)  BP(mean): --  RR: 17 (17 Oct 2019 11:31) (16 - 18)  SpO2: 100% (17 Oct 2019 11:31) (98% - 100%)    Finger Stick        10-16 @ 07:  -  10-17 @ 07:00  --------------------------------------------------------  IN: 600 mL / OUT: 0 mL / NET: 600 mL    10-17 @ 07:01  -  10-17 @ 12:14  --------------------------------------------------------  IN: 375 mL / OUT: 0 mL / NET: 375 mL      PHYSICAL EXAM:  GENERAL:  [ x ] NAD, [ x ] Well appearing, lying upright in bed [  ] Agitated, [  ] Drowsy, [  ] Lethargy, [  ] Confused   HEAD:  [x  ] Normal, [  ] Other  EYES:  [x  ] EOMI, [ x ] PERRLA, [x  ] Conjunctiva and sclera clear normal, [  ] Other, [  ] Pallor, [  ] Discharge  ENMT:  [ x ] Normal, [ x ] Moist mucous membranes, [  ] Good dentition, [ x ] No thrush  NECK:  [ x ] Supple, [ x ] No JVD, [x  ] Normal thyroid, [  ] Lymphadenopathy, [  ] Other  CHEST/LUNG:  [ x ] Clear to auscultation bilaterally, [ x ] Breath Sounds equal, [ x ] No rales, [ x ] No rhonchi, [ x ] No wheezing  HEART:  [ x ] Regular rate and rhythm, [  ] Tachycardia, [  ] Bradycardia, [  ] Irregular, [ x ] No murmurs, No rubs, No gallops, [  ] PPM in place (Mfr:  )  ABDOMEN:  [ x ] Soft, [ x ] Nontender, [ x ] Nondistended, [ x ] No mass, [ x ] Bowel sounds present, [ x ] flat abdomen   NERVOUS SYSTEM:  [ x ] Alert & Oriented x3, [ x ] Nonfocal, [  ] Confusion, [  ] Encephalopathic, [  ] Sedated, [  ] Unable to assess, [  ] Other-  EXTREMITIES:  [ x ] 2+ Peripheral Pulses, No clubbing, No cyanosis,  [ x ] no edema  [  ] PVD stasis skin changes B/L lower EXT  LYMPH:  No lymphadenopathy noted  SKIN:  [ x ] No rashes or lesions, [  ] Pressure ulcers, [  ] Ecchymosis, [  ] Skin tears, [  ] Other    DIET: regular diet     LABS:                        11.7   10.64 )-----------( 299      ( 17 Oct 2019 06:25 )             36.6     17 Oct 2019 06:25    140    |  111    |  15     ----------------------------<  96     4.1     |  24     |  0.75     Ca    8.7        17 Oct 2019 06:25    TPro  6.8    /  Alb  3.4    /  TBili  0.3    /  DBili  x      /  AST  13     /  ALT  18     /  AlkPhos  90     17 Oct 2019 06:25      Urinalysis Basic - ( 17 Oct 2019 00:43 )    Color: Yellow / Appearance: Clear / S.010 / pH: x  Gluc: x / Ketone: Negative  / Bili: Negative / Urobili: Negative   Blood: x / Protein: Negative / Nitrite: Negative   Leuk Esterase: Trace / RBC: 0-2 /HPF / WBC 26-50   Sq Epi: x / Non Sq Epi: Few / Bacteria: TNTC    Thyroid Panel:  Thyroid Stimulating Hormone, Serum: 3.14 uIU/mL (10-16-19 @ 19:26)    Lithium Level, Serum (10.17.19 @ 06:25)    Lithium Level, Serum: 1.3 mmol/L      RADIOLOGY & ADDITIONAL TESTS:  < from: Xray Chest 2 Views PA/Lat (10.16.19 @ 21:19) >    EXAM:  XR CHEST PA LAT 2V                            PROCEDURE DATE:  10/16/2019          INTERPRETATION:  Clinical indication:  admission, tremors and hives.    Technique: XR CHEST PA AND LATERAL    Comparison:2018    Findings:  Lines: None    Heart/Mediastinum/Lungs: The heart size is normal.The lungs are   clear.There are no pleural effusions.    Impression:    Clear lungs.          < end of copied text >      HEALTH ISSUES - PROBLEM Dx:  Prophylactic measure: Prophylactic measure  Anxiety: Anxiety  Depression: Depression  Hypothyroid: Hypothyroid  Leukocytosis: Leukocytosis        Consultant(s) Notes Reviewed:  [ x ] YES     Care Discussed with [  ] Consultants, [ x ] Patient, [  ] Family, [  ] , [  ] Social Service, [x  ] RN, [  ] Physical Therapy  DVT PPX: [  ] Lovenox, [  ] SC Heparin, [  ] Coumadin, [  ] Xarelto, [  ] Eliquis, [  ] Pradaxa, [  ] IV Heparin drip, [  ] SCD, [  ] Contraindication secondary to GI Bleed, [ x ] Ambulation  Advanced Directive: [ x ] None, [  ] DNR/DNI

## 2019-10-17 NOTE — PROGRESS NOTE ADULT - PROBLEM SELECTOR PLAN 6
IMPROVE VTE Individual Risk Assessment  RISK                                                                Points  [  ] Previous VTE                                                  3  [  ] Thrombophilia                                               2  [  ] Lower limb paralysis                                      2  [  ] Current Cancer                                              2         (within 6 months)  [  ] Immobilization > 24 hrs                                1  [  ] ICU/CCU stay > 24 hours                              1  [  ] Age > 60                                                      1  IMPROVE VTE Score _____0____    DVT PPX: ambulate, OOB Ambulation, SCD

## 2019-10-17 NOTE — DISCHARGE NOTE PROVIDER - NSDCACTIVITY_GEN_ALL_CORE
No restrictions No restrictions/Return to Work/School allowed/Bathing allowed/Showering allowed/Walking - Indoors allowed/Walking - Outdoors allowed

## 2019-10-17 NOTE — PROGRESS NOTE ADULT - PROBLEM SELECTOR PLAN 4
cont Buspar 7.5 mg 3x day   Psych DR Leif bajwa cont Buspar 7.5 mg 3x day   Psych DR Leif bajwa-stable for d/c

## 2019-10-17 NOTE — DISCHARGE NOTE NURSING/CASE MANAGEMENT/SOCIAL WORK - PATIENT PORTAL LINK FT
You can access the FollowMyHealth Patient Portal offered by [facility] by registering at the following website: [email or web address]. By joining [facility]’s FollowMyHealth portal, you will also be able to view your health information using other applications (apps) compatible with our system.

## 2019-10-17 NOTE — PROGRESS NOTE ADULT - I WAS PHYSICALLY PRESENT FOR THE KEY PORTIONS OF THE EVALUATION AND MANAGEMENT (E/M) SERVICE PROVIDED.  I AGREE WITH THE ABOVE HISTORY, PHYSICAL, AND PLAN WHICH I HAVE REVIEWED AND EDITED WHERE APPROPRIATE
I will SWITCH the dose or number of times a day I take the medications listed below when I get home from the hospital:  None Statement Selected

## 2019-10-17 NOTE — BEHAVIORAL HEALTH ASSESSMENT NOTE - NSBHCHARTREVIEWIMAGING_PSY_A_CORE FT
< from: Xray Chest 2 Views PA/Lat (10.16.19 @ 21:19) >    EXAM:  XR CHEST PA LAT 2V                            PROCEDURE DATE:  10/16/2019          INTERPRETATION:  Clinical indication:  admission, tremors and hives.    Technique: XR CHEST PA AND LATERAL    Comparison:12/19/2018    Findings:  Lines: None    Heart/Mediastinum/Lungs: The heart size is normal.The lungs are   clear.There are no pleural effusions.    Impression:    Clear lungs.                WILFRID OJEDA M.D. ATTENDING RADIOLOGIST  This document has been electronically signed. Oct 17 2019  8:38AM    < end of copied text >

## 2019-10-17 NOTE — BEHAVIORAL HEALTH ASSESSMENT NOTE - NSBHCHARTREVIEWLAB_PSY_A_CORE FT
11.7   10.64 )-----------( 299      ( 17 Oct 2019 06:25 )             36.6   10-17    140  |  111<H>  |  15  ----------------------------<  96  4.1   |  24  |  0.75    Ca    8.7      17 Oct 2019 06:25    TPro  6.8  /  Alb  3.4  /  TBili  0.3  /  DBili  x   /  AST  13<L>  /  ALT  18  /  AlkPhos  90  10-17

## 2019-10-17 NOTE — BEHAVIORAL HEALTH ASSESSMENT NOTE - SUICIDE RISK FACTORS
Access to lethal methods (pills, firearm, etc.: Ask specifically about presence or absence of a firearm in the home or ease of accessing/History of abuse/trauma

## 2019-10-17 NOTE — BEHAVIORAL HEALTH ASSESSMENT NOTE - HPI (INCLUDE ILLNESS QUALITY, SEVERITY, DURATION, TIMING, CONTEXT, MODIFYING FACTORS, ASSOCIATED SIGNS AND SYMPTOMS)
Patient seen, evaluated and chart reviewed. Patient is a 43 y/o  F,  x3, noncaregiver to 2 sons (age 21 and 16, minor lives with his bio father), employed, lives with parents, with charted diagnoses of Borderline PD, r/o Bipolar II Disorder, cannabis use disorder, alcohol use disorder, r/o post-traumatic stress disorder, 5 prior hospitalizations (most recently October at [facility]), one reported suicide attempt at age 16 by slicing wrists, no hx of violence/legal problems,   This is a 44 year old female with past medical history borderline personality disorder, bipolar disorder, anxiety/depression with previous SI, and hypothyroidism presenting to [facility] with tremors and itchiness and thinks her lithium level is too high.  Patient was recently discharged from [facility] on 10/4 and has had tremors in her upper extremities since before discharge. 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  Patient states she came in to [place] today because the itching on chest was unbearable the night prior to admission.  Patient states she put chamomile lotion and did not get relief however patient denied itching at time of interview.  Patient is a poor historian, but denies intent to hurt herself, and the mother is very supportive expressing no concern about patient's safety. Currently no evidence of psychosis, kiara or depression. Initially her level was 2, currently 1.3 after Lithium held. It is unclear what contributed to elevated level, patient appears to be confused about the dose of Lithium she is supposed to be taking (difference between her report and her mother's).

## 2019-10-17 NOTE — BEHAVIORAL HEALTH ASSESSMENT NOTE - SUMMARY
42 y/o  F,  x3, noncaregiver to 2 sons (age 21 and 16, minor lives with his bio father), employed, lives with parents, with charted diagnoses of Borderline PD, r/o Bipolar II Disorder, cannabis use disorder, alcohol use disorder, r/o post-traumatic stress disorder, 4 prior hospitalizations (most recently March 2018 at [facility]), one reported suicide attempt at age 16 by slicing wrists, no hx of violence/legal problems, PMhx significant for migraine HA, presents with symptoms of possible lithium toxicity.  Patient currently denies suicidal ideation, intent, or plan. She identifies reasons for living and engages in safety planning. She denies homicidal or violent ideation, intent, or plan. She does not meet criteria for a major depressive episode. She does not appear manic or psychotic. Pt does not present an acute danger to self or others and does not meet criteria for psychiatric admission at this time.

## 2019-10-17 NOTE — BEHAVIORAL HEALTH ASSESSMENT NOTE - NSBHCHARTREVIEWVS_PSY_A_CORE FT
Vital Signs Last 24 Hrs  T(C): 36.7 (17 Oct 2019 07:48), Max: 36.9 (17 Oct 2019 01:10)  T(F): 98.1 (17 Oct 2019 07:48), Max: 98.5 (17 Oct 2019 01:10)  HR: 80 (17 Oct 2019 07:48) (73 - 92)  BP: 121/85 (17 Oct 2019 07:48) (108/74 - 124/85)  BP(mean): --  RR: 17 (17 Oct 2019 07:48) (16 - 18)  SpO2: 100% (17 Oct 2019 07:48) (98% - 100%)

## 2019-10-17 NOTE — PROGRESS NOTE ADULT - ATTENDING COMMENTS
Pt seen, Examined, case & care plan d/w pt, residents at detail.  D/W Dr Yadav -psych at detail, pt stable for d/c home with out pt follow up with  Psych MD at [facility] as schedule on next week Monday  D/C home , Total d/c care time is 40 minutes .D/W mother with pt's full permission at bed side, about ou pt follow up.

## 2019-10-17 NOTE — PROGRESS NOTE ADULT - PROBLEM SELECTOR PLAN 2
-stabilizing 14.6-->10.6   likely reactive to mild lithium toxicity   Afebrile, Repeat CBC in AM -stabilizing 14.6-->10.6 -resolved   likely reactive   Afebrile,

## 2019-10-31 PROCEDURE — 96361 HYDRATE IV INFUSION ADD-ON: CPT

## 2019-10-31 PROCEDURE — 85027 COMPLETE CBC AUTOMATED: CPT

## 2019-10-31 PROCEDURE — 99285 EMERGENCY DEPT VISIT HI MDM: CPT | Mod: 25

## 2019-10-31 PROCEDURE — 71046 X-RAY EXAM CHEST 2 VIEWS: CPT

## 2019-10-31 PROCEDURE — 36415 COLL VENOUS BLD VENIPUNCTURE: CPT

## 2019-10-31 PROCEDURE — 84443 ASSAY THYROID STIM HORMONE: CPT

## 2019-10-31 PROCEDURE — 80307 DRUG TEST PRSMV CHEM ANLYZR: CPT

## 2019-10-31 PROCEDURE — 81001 URINALYSIS AUTO W/SCOPE: CPT

## 2019-10-31 PROCEDURE — 84702 CHORIONIC GONADOTROPIN TEST: CPT

## 2019-10-31 PROCEDURE — 80053 COMPREHEN METABOLIC PANEL: CPT

## 2019-10-31 PROCEDURE — 93005 ELECTROCARDIOGRAM TRACING: CPT

## 2019-10-31 PROCEDURE — 96374 THER/PROPH/DIAG INJ IV PUSH: CPT

## 2019-10-31 PROCEDURE — 80178 ASSAY OF LITHIUM: CPT

## 2019-11-01 ENCOUNTER — OUTPATIENT (OUTPATIENT)
Dept: OUTPATIENT SERVICES | Facility: HOSPITAL | Age: 44
LOS: 1 days | End: 2019-11-01
Payer: MEDICAID

## 2019-11-01 DIAGNOSIS — Z98.82 BREAST IMPLANT STATUS: Chronic | ICD-10-CM

## 2019-11-01 DIAGNOSIS — Z90.49 ACQUIRED ABSENCE OF OTHER SPECIFIED PARTS OF DIGESTIVE TRACT: Chronic | ICD-10-CM

## 2019-11-15 DIAGNOSIS — Z71.89 OTHER SPECIFIED COUNSELING: ICD-10-CM

## 2020-01-02 ENCOUNTER — TRANSCRIPTION ENCOUNTER (OUTPATIENT)
Age: 45
End: 2020-01-02

## 2020-07-09 NOTE — PROGRESS NOTE BEHAVIORAL HEALTH - NS ED BHA MED ROS EYES
Patient called to cancel today's appt stating the medication he's been given isn't doing anything. Rescheduled to tomorrow and told him a nurse would be calling him back (and also to prechart.)   
Telephone call to patient pre-chart for follow up visit with Dr. Johnson tomorrow on 07/10/2020. Patient stated that someone already called him to do this. Writer copied abstract note from DASHA Landry. Patient would like Dr. Johnson's nurse to call him back in regards to medications. Per WENDY Brewer, she will call patient back at a later time.  
Writer contacted patient back and did explain that since he has his appointment rescheduled to tomorrow Dr. Johnson can review his medications at that time and decide if changes are needed. Patient is in agreement. No further questions at this time.  
No complaints

## 2020-11-03 ENCOUNTER — TRANSCRIPTION ENCOUNTER (OUTPATIENT)
Age: 45
End: 2020-11-03

## 2021-01-08 NOTE — PATIENT PROFILE ADULT - COMPLETE THE FOLLOWING IF THE PATIENT REFUSES THE INFLUENZA VACCINE:
No-Patient/Caregiver offered and refused free interpretation services. Risks/benefits discussed with patient/surrogate

## 2021-01-11 ENCOUNTER — APPOINTMENT (OUTPATIENT)
Dept: ORTHOPEDIC SURGERY | Facility: CLINIC | Age: 46
End: 2021-01-11
Payer: MEDICAID

## 2021-01-11 VITALS — HEIGHT: 62 IN | WEIGHT: 140 LBS | BODY MASS INDEX: 25.76 KG/M2

## 2021-01-11 DIAGNOSIS — Z87.39 PERSONAL HISTORY OF OTHER DISEASES OF THE MUSCULOSKELETAL SYSTEM AND CONNECTIVE TISSUE: ICD-10-CM

## 2021-01-11 DIAGNOSIS — Z72.3 LACK OF PHYSICAL EXERCISE: ICD-10-CM

## 2021-01-11 DIAGNOSIS — Z78.9 OTHER SPECIFIED HEALTH STATUS: ICD-10-CM

## 2021-01-11 DIAGNOSIS — F17.200 NICOTINE DEPENDENCE, UNSPECIFIED, UNCOMPLICATED: ICD-10-CM

## 2021-01-11 PROCEDURE — 99072 ADDL SUPL MATRL&STAF TM PHE: CPT

## 2021-01-11 PROCEDURE — 99204 OFFICE O/P NEW MOD 45 MIN: CPT

## 2021-01-11 NOTE — DISCUSSION/SUMMARY
[de-identified] : Patient was seen today for evaluation and management of chronic bilateral hand third digit pain.  Patient has trigger finger of both hands.  She is already been on oral prednisone, as well as received a cortisone injection into the flexor tendon sheath of the third digit of the right hand.  Patient has persisting trigger finger despite these interventions.  I advised the patient she can  over-the-counter trigger finger splint to be used in the evenings for support.  However, with the chronicity of her trigger fingers, as well as failed response to oral prednisone and injection of cortisone I recommend hand surgical consultation to discuss risk/benefits of trigger finger release prior to any further nonsurgical measures.  Patient appreciates and agrees with current plan.\par \par This note was generated using dragon medical dictation software.  A reasonable effort has been made for proofreading its contents, but typos may still remain.  If there are any questions or points of clarification needed please notify my office.

## 2021-01-11 NOTE — HISTORY OF PRESENT ILLNESS
[de-identified] : Patient is here for b/l hand 3rd digit pain. She states that her pain began over the summer. There was no inciting injury. She was seen by PMR and Rheum for treatment of it. She received 2-3 injections. The most recent was about a month ago. She did not have relief. She has used heat, ice, Prednisone to treat this pain. She has noticed skin pigment changes from the injections. She has increased symptoms when smoking. \par \par The patient's past medical history, past surgical history, medications and allergies were reviewed by me today and documented accordingly. In addition, the patient's family and social history, which were noncontributory to this visit, were reviewed also. Intake form was reviewed. The patient has no family history of arthritis.

## 2021-01-11 NOTE — PHYSICAL EXAM
[de-identified] : Constitutional: Well-nourished, well-developed, No acute distress\par Respiratory:  Good respiratory effort, no SOB\par Lymphatic: No regional lymphadenopathy, no lymphedema\par Psychiatric: Pleasant and normal affect, alert and oriented x3\par Skin: Clean dry and intact B/L UE/LE\par Musculoskeletal: normal except where as noted in regional exam\par \par B/L Elbows:  No asymmetry, malalignment, or swelling, Full ROM, 5/5 strength in flex/ext, pronation/supination, Joints stable\par B/L wrists: No asymmetry, malalignment, or swelling, Full ROM, 5/5 strength in wrist flexion/ext, and radial/ulnar deviation, Joints stable\par \par B/L Hands:\par APPEARANCE: + hypermobility with passive hyperextension of all IP joints.  no marked deformities, no swelling or malalignment\par POSITIVE TENDERNESS: mild along flexor tendon and sheath superficial to the MCP at the 3rd digit\par NONTENDER: osseous and ligamentous structures. \par ROM: full & painless although + triggering of the involved finger going from full flexion into extension. \par RESISTIVE TESTING: painless resisted testing. \par SPECIAL TESTS: normal sensation of 1st through 5th digits, normal flex/ext, abd/add, and opposition of thumb, no triggering of other fingers\par Vasc: 2+ radial pulse, normal capillary refill\par Neuro: AIN, PIN, Ulnar nerve intact to motor\par Sensation: Intact to light touch throughout\par

## 2021-01-18 NOTE — PATIENT PROFILE ADULT. - DATE PHYSICIAN CONTACTED RE: SUICIDAL IDEATION
Topical Sulfur Applications Counseling: Topical Sulfur Counseling: Patient counseled that this medication may cause skin irritation or allergic reactions.  In the event of skin irritation, the patient was advised to reduce the amount of the drug applied or use it less frequently.   The patient verbalized understanding of the proper use and possible adverse effects of topical sulfur application.  All of the patient's questions and concerns were addressed. 17-Dec-2017

## 2021-01-21 ENCOUNTER — APPOINTMENT (OUTPATIENT)
Dept: ORTHOPEDIC SURGERY | Facility: CLINIC | Age: 46
End: 2021-01-21
Payer: MEDICAID

## 2021-01-21 VITALS — BODY MASS INDEX: 25.76 KG/M2 | WEIGHT: 140 LBS | HEIGHT: 62 IN

## 2021-01-21 DIAGNOSIS — M20.032 SWAN-NECK DEFORMITY OF LEFT FINGER(S): ICD-10-CM

## 2021-01-21 DIAGNOSIS — M20.031 SWAN-NECK DEFORMITY OF RIGHT FINGER(S): ICD-10-CM

## 2021-01-21 DIAGNOSIS — M65.332 TRIGGER FINGER, LEFT MIDDLE FINGER: ICD-10-CM

## 2021-01-21 DIAGNOSIS — M24.242 DISORDER OF LIGAMENT, LEFT HAND: ICD-10-CM

## 2021-01-21 DIAGNOSIS — M24.20 DISORDER OF LIGAMENT, UNSPECIFIED SITE: ICD-10-CM

## 2021-01-21 DIAGNOSIS — M65.331 TRIGGER FINGER, RIGHT MIDDLE FINGER: ICD-10-CM

## 2021-01-21 DIAGNOSIS — M24.241 DISORDER OF LIGAMENT, RIGHT HAND: ICD-10-CM

## 2021-01-21 PROCEDURE — 99214 OFFICE O/P EST MOD 30 MIN: CPT

## 2021-01-21 PROCEDURE — 99072 ADDL SUPL MATRL&STAF TM PHE: CPT

## 2021-01-22 PROBLEM — M20.032 SWAN-NECK DEFORMITY OF FINGER OF LEFT HAND: Status: ACTIVE | Noted: 2021-01-22

## 2021-01-22 PROBLEM — M20.031 SWAN-NECK DEFORMITY OF FINGER OF RIGHT HAND: Status: ACTIVE | Noted: 2021-01-22

## 2021-02-26 NOTE — ED ADULT NURSE NOTE - CHIEF COMPLAINT
Pt confused about what the process is to schedule a hysteroscopy.  Pt is in need of a sedated hysteroscopy per the letter from [facility].  This writer is to bring pt information to [facility] today.    Information via inbox as [facility] not in clinic.  Called pt regarding the appointment.  Keeping the appointment as 15 minute discuss with Dr Armendariz about surgery questions.  
The patient is a 43y Female complaining of medical evaluation.

## 2021-04-27 ENCOUNTER — TRANSCRIPTION ENCOUNTER (OUTPATIENT)
Age: 46
End: 2021-04-27

## 2021-05-12 ENCOUNTER — APPOINTMENT (OUTPATIENT)
Dept: ORTHOPEDIC SURGERY | Facility: CLINIC | Age: 46
End: 2021-05-12
Payer: MEDICAID

## 2021-05-12 DIAGNOSIS — M25.562 PAIN IN LEFT KNEE: ICD-10-CM

## 2021-05-12 DIAGNOSIS — M25.561 PAIN IN RIGHT KNEE: ICD-10-CM

## 2021-05-12 DIAGNOSIS — Z87.39 PERSONAL HISTORY OF OTHER DISEASES OF THE MUSCULOSKELETAL SYSTEM AND CONNECTIVE TISSUE: ICD-10-CM

## 2021-05-12 PROCEDURE — 99072 ADDL SUPL MATRL&STAF TM PHE: CPT

## 2021-05-12 PROCEDURE — 99214 OFFICE O/P EST MOD 30 MIN: CPT

## 2021-05-12 PROCEDURE — 73562 X-RAY EXAM OF KNEE 3: CPT | Mod: LT

## 2021-05-12 RX ORDER — METHOTREXATE 2.5 MG/1
TABLET ORAL
Refills: 0 | Status: ACTIVE | COMMUNITY

## 2021-05-12 NOTE — PHYSICAL EXAM
[de-identified] : General appearance: well nourished and hydrated, pleasant, alert and oriented x 3, cooperative.\par Cardiovascular: no apparent abnormalities, no lower leg edema, no varicosities, pedal pulses are palpable.\par Neurologic: sensation is normal, no muscle weakness in upper or lower extremities\par Dermatologic no apparent skin lesions, moist, warm, no rash.\par Gait: nonantalgic.\par \par Left knee\par Inspection: no effusion or erythema.\par Wounds: none.\par Alignment: normal.\par Palpation: no specific tenderness on palpation.\par ROM: 0-110 degrees\par Ligamentous laxity: all ligaments appear stable\par Muscle Test: good quad strength.\par \par Right knee\par Inspection: no effusion or erythema.\par Wounds: none.\par Alignment: normal.\par Palpation: no specific tenderness on palpation.\par ROM: 0-110 degrees, 20 degree extensor lag due to pain inhibition. I was unable to appreciate any significant PF crepitus. \par Ligamentous laxity: all ligaments appear stable\par Muscle Test: good quad strength.\par \par Left hip\par Inspection: No swelling or ecchymosis.\par Wounds: none.\par Palpation: non-tender.\par Stability: no instability.\par Strength: 5/5 all motor groups.\par ROM: no pain with FROM.\par Leg length: equal.\par \par Right hip\par Inspection: No swelling or ecchymosis.\par Wounds: none.\par Palpation: non-tender.\par Stability: no instability.\par Strength: 5/5 all motor groups.\par ROM: no pain with FROM.\par Leg length: equal. [de-identified] : Radiographs done today AP lateral and skyline of both knees shows well maintained joint spaces in the left knee. The right knee has well maintained TF joint spaces. The right knee PF joint revealed some flattening of patella but with a maintained PF joint space.

## 2021-05-12 NOTE — HISTORY OF PRESENT ILLNESS
[Pain Location] : pain [] : left knee [Worsening] : worsening [8] : a maximum pain level of 8/10 [Walking] : walking [Constant] : ~He/She~ states the symptoms seem to be constant [de-identified] : 45 year old female presents to the office today complaining of bilateral knee pain, left knee more painful than right. Patient reports pain that is sharp and achy in nature. He reports swelling, buckling, locking, clicking, and a loss of motion, difficulty with flexion. Patient reports only being able to ambulate about 1-2 blocks before pain stops her. There is pain and difficulty with negotiating stairs and standing after prolonged sitting. Patient reports taking prednisone prescribed by her rheumatologist and Advil with only some relief. Patient reports having her knee aspirated several times by both her rheumatologist and another orthopedist. Patient states she has received both gel and cortisone injections in the past without any relief ever, most recently having one February 2021. Patient is here today to discuss her next options for pain relief.

## 2021-05-12 NOTE — ASSESSMENT
[FreeTextEntry1] : 45 year old female with RA presents to the office complaining of bilateral knee pain. 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 She is under the care of a rheumatologist and has been under the care of orthopedic surgeons. Shes currently on methotrexate and prednisone. She has also tried gel injections and cortisone injections in the past. 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 I therefore feel her RA is contributing to her bilateral knee pain. I spoke to her rheumatologist and suggested they modify her medications along with an NSAID to address PF arthritis. In the event that this does not work i will be happy to see her back.

## 2021-05-28 NOTE — PATIENT PROFILE ADULT - NSPROMUTPARTICIPCAREFT_GEN_A_NUR
[facility]    Facility:   [facility] [[id number]]   Code Status: DNR/DNI      CHIEF COMPLAINT/REASON FOR VISIT:  Chief Complaint   Patient presents with     Review Of Multiple Medical Conditions     Acute kidney injury on chronic kidney disease, hypertension, bipolar depression anemia of chronic disease, lower extremity edema, history of UTIs with recent treatment.  Type 2 diabetes, osteoarthritis,       HISTORY:      HPI: Roseanna is a 89 y.o. female who resides here at the [facility] for multiple medical rounds patient does have bipolar depression but it is in relative remission at this time.  She is having no signs of any laura or depressive symptoms.  She did have some nausea and vomiting yesterday but that has abated and no symptoms today.  She does have anemia of chronic disease as well as chronic kidney disease and she does have lower extremity edema.  Back in February she was treated for urinary tract infection however UC showed no growth growth however UA showed trace blood and trace leuk sites and moderate bacteria.  She has a low-grade temp and she was treated with nitrofurantoin and this seemed to improve.  She claims she is eating drinking well denies any other issues at this time.    Past Medical History:   Diagnosis Date     (Lower) Leg Localized Swelling Bilateral     Created by Conversion      Adjustment disorder with mixed anxiety and depressed mood 8/11/2014     Bipolar Disorder     Created by Conversion  Replacement Utility updated for latest IMO load     Bipolar I disorder, most recent episode (or current) depressed, in partial or unspecified remission 4/13/2015     Bipolar I disorder, most recent episode (or current) depressed, mild 8/11/2014     Replacement Utility updated for latest IMO load     Cellulitis     Created by Conversion      Cerumen Impaction     Created by Conversion      Chronic Kidney Disease With Benign Hypertension      Created by Conversion      Chronic Kidney Disease, Stage 4     Created by Conversion      Intertrigo 2015     Osteoarthritis Of The Knee     Created by Conversion  Replacement Utility updated for latest IMO load     Type 2 Diabetes Mellitus     Created by Conversion              Family History   Problem Relation Age of Onset     Diabetes Sister      Cancer Sister      Diabetes Brother      Cancer Brother      Diabetes Son      Diabetes Sister      Cancer Son         colon cancer     Social History     Socioeconomic History     Marital status:      Spouse name: None     Number of children: None     Years of education: None     Highest education level: None   Occupational History     None   Social Needs     Financial resource strain: None     Food insecurity:     Worry: None     Inability: None     Transportation needs:     Medical: None     Non-medical: None   Tobacco Use     Smoking status: Former Smoker     Types: Cigarettes     Start date: 1949     Last attempt to quit: 1990     Years since quittin.0     Smokeless tobacco: Never Used   Substance and Sexual Activity     Alcohol use: No     Frequency: Never     Comment: rare     Drug use: No     Sexual activity: Never   Lifestyle     Physical activity:     Days per week: None     Minutes per session: None     Stress: None   Relationships     Social connections:     Talks on phone: None     Gets together: None     Attends Sabianist service: None     Active member of club or organization: None     Attends meetings of clubs or organizations: None     Relationship status: None     Intimate partner violence:     Fear of current or ex partner: None     Emotionally abused: None     Physically abused: None     Forced sexual activity: None   Other Topics Concern     None   Social History Narrative     None         Review of Systems   Constitutional:        Patient denies any pain fevers chills nausea vomiting diarrhea change in vision hearing  taste or smell weakness one-sided of the chest pain shortness of breath.  She denies any current shortness stool polyphagia polydipsia polyuria depression or anxiety and there is no urgency frequency burning with urination.  The remainder the review of systems is negative she is sleeping okay at night and appetite is good.       .  Vitals:    05/17/19 1008   BP: 138/89   Pulse: 76   Resp: 18   Temp: 98.9  F (37.2  C)   SpO2: 99%       Physical Exam   Constitutional: No distress.   HENT:   Head: Normocephalic and atraumatic.   Nose: Nose normal.   Mouth/Throat: No oropharyngeal exudate.   Eyes: Conjunctivae are normal. Right eye exhibits no discharge. Left eye exhibits no discharge.   Neck: Neck supple. No thyromegaly present.   Cardiovascular: Normal rate and regular rhythm.   Pulmonary/Chest: Effort normal and breath sounds normal. No respiratory distress.   Abdominal: Soft. Bowel sounds are normal. She exhibits distension. There is no tenderness. There is no rebound.   Musculoskeletal: She exhibits edema.   Lymphadenopathy:     She has no cervical adenopathy.   Neurological: She is alert. No cranial nerve deficit. She exhibits normal muscle tone.   Skin: She is not diaphoretic.   Psychiatric: She has a normal mood and affect.         LABS: On 2/19/2019 white count 7.9, hemoglobin 10.6, platelets 193,000.  Sodium is 145, potassium 3.9, CO2 is 26, calcium 9.3, BUN is 33, creatinine is 1.99 which is up from previous 1.49 at that time in GFR was 24.  Blood sugar on 5/1/2019 was 96 and then 90.  These are the only 2 in the chart.      ASSESSMENT:      ICD-10-CM    1. Urinary tract infection with hematuria, site unspecified N39.0     R31.9    2. Bipolar affective disorder, remission status unspecified (H) F31.9    3. Bilateral edema of lower extremity R60.0    4. Type 2 diabetes mellitus with complication, without long-term current use of insulin (H) E11.8        PLAN: Plan at this time will continue to monitor above  medical problems will check a basic metabolic profile on next lab day.  According to her signs and symptoms she does not have any signs of urinary tract infection.  Her bipolar affective disorder seems to be doing okay at this time with no signs of laura or depression.  And her blood sugars have been pretty normal and A1c should be drawn on next lab day.  I will continue to monitor above medical problems and no other changes to care plan at this time.  Care plan was reviewed and is appropriate.          Electronically signed by: Sher Monaco DO   none

## 2021-06-04 ENCOUNTER — APPOINTMENT (OUTPATIENT)
Dept: ORTHOPEDIC SURGERY | Facility: CLINIC | Age: 46
End: 2021-06-04

## 2021-07-12 ENCOUNTER — APPOINTMENT (OUTPATIENT)
Dept: ORTHOPEDIC SURGERY | Facility: CLINIC | Age: 46
End: 2021-07-12

## 2021-08-23 ENCOUNTER — OUTPATIENT (OUTPATIENT)
Dept: OUTPATIENT SERVICES | Facility: HOSPITAL | Age: 46
LOS: 1 days | End: 2021-08-23
Payer: MEDICAID

## 2021-08-23 VITALS
WEIGHT: 136.69 LBS | OXYGEN SATURATION: 100 % | TEMPERATURE: 98 F | SYSTOLIC BLOOD PRESSURE: 121 MMHG | DIASTOLIC BLOOD PRESSURE: 77 MMHG | HEART RATE: 68 BPM | HEIGHT: 62 IN | RESPIRATION RATE: 18 BRPM

## 2021-08-23 DIAGNOSIS — F41.9 ANXIETY DISORDER, UNSPECIFIED: ICD-10-CM

## 2021-08-23 DIAGNOSIS — Z90.49 ACQUIRED ABSENCE OF OTHER SPECIFIED PARTS OF DIGESTIVE TRACT: Chronic | ICD-10-CM

## 2021-08-23 DIAGNOSIS — S92.332B: ICD-10-CM

## 2021-08-23 DIAGNOSIS — Z01.818 ENCOUNTER FOR OTHER PREPROCEDURAL EXAMINATION: ICD-10-CM

## 2021-08-23 DIAGNOSIS — S93.115A DISLOCATION OF INTERPHALANGEAL JOINT OF LEFT LESSER TOE(S), INITIAL ENCOUNTER: ICD-10-CM

## 2021-08-23 DIAGNOSIS — G47.00 INSOMNIA, UNSPECIFIED: ICD-10-CM

## 2021-08-23 DIAGNOSIS — Z98.82 BREAST IMPLANT STATUS: Chronic | ICD-10-CM

## 2021-08-23 DIAGNOSIS — S92.322B: ICD-10-CM

## 2021-08-23 DIAGNOSIS — S92.345B: ICD-10-CM

## 2021-08-23 DIAGNOSIS — Z98.890 OTHER SPECIFIED POSTPROCEDURAL STATES: Chronic | ICD-10-CM

## 2021-08-23 DIAGNOSIS — E03.9 HYPOTHYROIDISM, UNSPECIFIED: ICD-10-CM

## 2021-08-23 LAB
ANION GAP SERPL CALC-SCNC: 11 MMOL/L — SIGNIFICANT CHANGE UP (ref 5–17)
BUN SERPL-MCNC: 15 MG/DL — SIGNIFICANT CHANGE UP (ref 7–23)
CALCIUM SERPL-MCNC: 8.8 MG/DL — SIGNIFICANT CHANGE UP (ref 8.4–10.5)
CHLORIDE SERPL-SCNC: 106 MMOL/L — SIGNIFICANT CHANGE UP (ref 96–108)
CO2 SERPL-SCNC: 21 MMOL/L — LOW (ref 22–31)
CREAT SERPL-MCNC: 0.93 MG/DL — SIGNIFICANT CHANGE UP (ref 0.5–1.3)
GLUCOSE SERPL-MCNC: 98 MG/DL — SIGNIFICANT CHANGE UP (ref 70–99)
HCG SERPL-ACNC: <1 MIU/ML — SIGNIFICANT CHANGE UP
HCT VFR BLD CALC: 35.2 % — SIGNIFICANT CHANGE UP (ref 34.5–45)
HGB BLD-MCNC: 11.9 G/DL — SIGNIFICANT CHANGE UP (ref 11.5–15.5)
MCHC RBC-ENTMCNC: 30.8 PG — SIGNIFICANT CHANGE UP (ref 27–34)
MCHC RBC-ENTMCNC: 33.8 GM/DL — SIGNIFICANT CHANGE UP (ref 32–36)
MCV RBC AUTO: 91.2 FL — SIGNIFICANT CHANGE UP (ref 80–100)
NRBC # BLD: 0 /100 WBCS — SIGNIFICANT CHANGE UP (ref 0–0)
PLATELET # BLD AUTO: 236 K/UL — SIGNIFICANT CHANGE UP (ref 150–400)
POTASSIUM SERPL-MCNC: 3.8 MMOL/L — SIGNIFICANT CHANGE UP (ref 3.5–5.3)
POTASSIUM SERPL-SCNC: 3.8 MMOL/L — SIGNIFICANT CHANGE UP (ref 3.5–5.3)
RBC # BLD: 3.86 M/UL — SIGNIFICANT CHANGE UP (ref 3.8–5.2)
RBC # FLD: 13.4 % — SIGNIFICANT CHANGE UP (ref 10.3–14.5)
SODIUM SERPL-SCNC: 138 MMOL/L — SIGNIFICANT CHANGE UP (ref 135–145)
WBC # BLD: 7.53 K/UL — SIGNIFICANT CHANGE UP (ref 3.8–10.5)
WBC # FLD AUTO: 7.53 K/UL — SIGNIFICANT CHANGE UP (ref 3.8–10.5)

## 2021-08-23 PROCEDURE — G0463: CPT

## 2021-08-23 PROCEDURE — 80048 BASIC METABOLIC PNL TOTAL CA: CPT

## 2021-08-23 PROCEDURE — 84702 CHORIONIC GONADOTROPIN TEST: CPT

## 2021-08-23 PROCEDURE — 36415 COLL VENOUS BLD VENIPUNCTURE: CPT

## 2021-08-23 PROCEDURE — 85027 COMPLETE CBC AUTOMATED: CPT

## 2021-08-23 NOTE — H&P PST ADULT - NSICDXFAMILYHX_GEN_ALL_CORE_FT
FAMILY HISTORY:  Father  Still living? Unknown  FH: diabetes mellitus, Age at diagnosis: Age Unknown    Sibling  Still living? Yes, Estimated age: Age Unknown  Family history of bipolar disorder, Age at diagnosis: Age Unknown

## 2021-08-23 NOTE — H&P PST ADULT - NSICDXPASTMEDICALHX_GEN_ALL_CORE_FT
PAST MEDICAL HISTORY:  Alcohol abuse     Anxiety     Depression     Hypothyroid     Migraine     Severe episode of recurrent major depressive disorder, with psychotic features      PAST MEDICAL HISTORY:  Alcohol abuse     Anxiety     Arthritis     Depression     Hypothyroid     Insomnia     Migraine     Severe episode of recurrent major depressive disorder, with psychotic features

## 2021-08-23 NOTE — H&P PST ADULT - NEGATIVE ENMT SYMPTOMS
no hearing difficulty/no ear pain/no nasal congestion/no nasal discharge/no nasal obstruction/no post-nasal discharge

## 2021-08-23 NOTE — H&P PST ADULT - NSICDXPASTSURGICALHX_GEN_ALL_CORE_FT
PAST SURGICAL HISTORY:  Delivered by  section     S/P breast augmentation     S/P cholecystectomy     S/P knee surgery right

## 2021-08-23 NOTE — H&P PST ADULT - PROBLEM SELECTOR PLAN 4
Scheduled for open reduction internal fixation 2nd, 3rd, 4th, metatarsal left foot with Dr Suazo on 08/27/2021.  Pre op instructions given and patient verbalized understanding.  CBC, BMP, HCG and medical clearance pending.  COVID-19 testing scheduled for 08/26/2021 at [facility].  IS given with instructions.

## 2021-08-23 NOTE — H&P PST ADULT - NEGATIVE GENERAL GENITOURINARY SYMPTOMS
Patient states her BS read 437 at home. Patient also states she has been having a headache.
no hematuria/no bladder infections/no incontinence/no dysuria/no urinary hesitancy

## 2021-08-23 NOTE — H&P PST ADULT - ATTENDING COMMENTS
I spoke with this patient and reviewed her history .  She denies any interval changes in her medical status since PST and medical clearance.  She denies any physical complaints today.  Yanely MATTSON

## 2021-08-23 NOTE — H&P PST ADULT - NSANTHOSAYNRD_GEN_A_CORE
neck 13 inches/No. LUISITO screening performed.  STOP BANG Legend: 0-2 = LOW Risk; 3-4 = INTERMEDIATE Risk; 5-8 = HIGH Risk

## 2021-08-23 NOTE — H&P PST ADULT - HISTORY OF PRESENT ILLNESS
45 y/o female with PMH of insomnia, anxiety depression, hypothyroidism, and arthritis presents for PST.  C/O left foot pain after a trip over a pipe sticking out of concrete outdoors.  Had additional work up and was diagnoses with fracture requiring surgical repair.  Scheduled for open reduction internal fixation 2nd, 3rd, 4th metatarsal, left foot, open reduction internal fixation 5th MPJ left with Dr Suazo on 08/27/2021.  COVID-19 testing scheduled for 08/26/2021 at [facility],

## 2021-08-26 ENCOUNTER — OUTPATIENT (OUTPATIENT)
Dept: OUTPATIENT SERVICES | Facility: HOSPITAL | Age: 46
LOS: 1 days | End: 2021-08-26
Payer: MEDICAID

## 2021-08-26 DIAGNOSIS — Z20.828 CONTACT WITH AND (SUSPECTED) EXPOSURE TO OTHER VIRAL COMMUNICABLE DISEASES: ICD-10-CM

## 2021-08-26 DIAGNOSIS — Z90.49 ACQUIRED ABSENCE OF OTHER SPECIFIED PARTS OF DIGESTIVE TRACT: Chronic | ICD-10-CM

## 2021-08-26 DIAGNOSIS — Z98.82 BREAST IMPLANT STATUS: Chronic | ICD-10-CM

## 2021-08-26 DIAGNOSIS — Z98.890 OTHER SPECIFIED POSTPROCEDURAL STATES: Chronic | ICD-10-CM

## 2021-08-26 LAB — SARS-COV-2 RNA SPEC QL NAA+PROBE: DETECTED

## 2021-08-26 PROCEDURE — U0003: CPT

## 2021-08-26 PROCEDURE — U0005: CPT

## 2021-09-13 DIAGNOSIS — Z01.818 ENCOUNTER FOR OTHER PREPROCEDURAL EXAMINATION: ICD-10-CM

## 2021-09-14 ENCOUNTER — APPOINTMENT (OUTPATIENT)
Dept: DISASTER EMERGENCY | Facility: CLINIC | Age: 46
End: 2021-09-14

## 2021-09-14 RX ORDER — SODIUM CHLORIDE 9 MG/ML
1000 INJECTION, SOLUTION INTRAVENOUS
Refills: 0 | Status: DISCONTINUED | OUTPATIENT
Start: 2021-09-17 | End: 2021-09-17

## 2021-09-15 LAB — SARS-COV-2 N GENE NPH QL NAA+PROBE: DETECTED

## 2021-09-16 ENCOUNTER — TRANSCRIPTION ENCOUNTER (OUTPATIENT)
Age: 46
End: 2021-09-16

## 2021-09-16 ENCOUNTER — OUTPATIENT (OUTPATIENT)
Dept: OUTPATIENT SERVICES | Facility: HOSPITAL | Age: 46
LOS: 1 days | End: 2021-09-16
Payer: MEDICAID

## 2021-09-16 DIAGNOSIS — Z90.49 ACQUIRED ABSENCE OF OTHER SPECIFIED PARTS OF DIGESTIVE TRACT: Chronic | ICD-10-CM

## 2021-09-16 DIAGNOSIS — Z98.82 BREAST IMPLANT STATUS: Chronic | ICD-10-CM

## 2021-09-16 DIAGNOSIS — Z20.828 CONTACT WITH AND (SUSPECTED) EXPOSURE TO OTHER VIRAL COMMUNICABLE DISEASES: ICD-10-CM

## 2021-09-16 DIAGNOSIS — Z98.890 OTHER SPECIFIED POSTPROCEDURAL STATES: Chronic | ICD-10-CM

## 2021-09-16 LAB — SARS-COV-2 RNA SPEC QL NAA+PROBE: SIGNIFICANT CHANGE UP

## 2021-09-16 PROCEDURE — 87635 SARS-COV-2 COVID-19 AMP PRB: CPT

## 2021-09-17 ENCOUNTER — OUTPATIENT (OUTPATIENT)
Dept: OUTPATIENT SERVICES | Facility: HOSPITAL | Age: 46
LOS: 1 days | End: 2021-09-17
Payer: MEDICAID

## 2021-09-17 VITALS
OXYGEN SATURATION: 99 % | HEART RATE: 70 BPM | DIASTOLIC BLOOD PRESSURE: 68 MMHG | SYSTOLIC BLOOD PRESSURE: 102 MMHG | RESPIRATION RATE: 16 BRPM | TEMPERATURE: 98 F

## 2021-09-17 VITALS
DIASTOLIC BLOOD PRESSURE: 80 MMHG | HEART RATE: 54 BPM | TEMPERATURE: 97 F | RESPIRATION RATE: 16 BRPM | OXYGEN SATURATION: 98 % | HEIGHT: 62 IN | SYSTOLIC BLOOD PRESSURE: 118 MMHG | WEIGHT: 136.69 LBS

## 2021-09-17 DIAGNOSIS — S93.115A DISLOCATION OF INTERPHALANGEAL JOINT OF LEFT LESSER TOE(S), INITIAL ENCOUNTER: ICD-10-CM

## 2021-09-17 DIAGNOSIS — Z90.49 ACQUIRED ABSENCE OF OTHER SPECIFIED PARTS OF DIGESTIVE TRACT: Chronic | ICD-10-CM

## 2021-09-17 DIAGNOSIS — Z98.82 BREAST IMPLANT STATUS: Chronic | ICD-10-CM

## 2021-09-17 DIAGNOSIS — S92.322B: ICD-10-CM

## 2021-09-17 DIAGNOSIS — Z98.890 OTHER SPECIFIED POSTPROCEDURAL STATES: Chronic | ICD-10-CM

## 2021-09-17 PROCEDURE — 73620 X-RAY EXAM OF FOOT: CPT

## 2021-09-17 PROCEDURE — 28485 OPTX METATARSAL FX EACH: CPT | Mod: T1

## 2021-09-17 PROCEDURE — C1713: CPT

## 2021-09-17 PROCEDURE — 28645 REPAIR TOE DISLOCATION: CPT | Mod: T4

## 2021-09-17 PROCEDURE — 76000 FLUOROSCOPY <1 HR PHYS/QHP: CPT

## 2021-09-17 PROCEDURE — 71045 X-RAY EXAM CHEST 1 VIEW: CPT | Mod: 26

## 2021-09-17 PROCEDURE — 73620 X-RAY EXAM OF FOOT: CPT | Mod: 26,LT

## 2021-09-17 PROCEDURE — 71045 X-RAY EXAM CHEST 1 VIEW: CPT

## 2021-09-17 RX ORDER — SODIUM CHLORIDE 9 MG/ML
1000 INJECTION, SOLUTION INTRAVENOUS
Refills: 0 | Status: DISCONTINUED | OUTPATIENT
Start: 2021-09-17 | End: 2021-09-17

## 2021-09-17 RX ORDER — IBUPROFEN 200 MG
600 TABLET ORAL EVERY 6 HOURS
Refills: 0 | Status: DISCONTINUED | OUTPATIENT
Start: 2021-09-17 | End: 2021-09-17

## 2021-09-17 RX ORDER — OXYCODONE AND ACETAMINOPHEN 5; 325 MG/1; MG/1
1 TABLET ORAL EVERY 6 HOURS
Refills: 0 | Status: DISCONTINUED | OUTPATIENT
Start: 2021-09-17 | End: 2021-09-17

## 2021-09-17 RX ORDER — HYDROMORPHONE HYDROCHLORIDE 2 MG/ML
0.5 INJECTION INTRAMUSCULAR; INTRAVENOUS; SUBCUTANEOUS
Refills: 0 | Status: DISCONTINUED | OUTPATIENT
Start: 2021-09-17 | End: 2021-09-17

## 2021-09-17 RX ORDER — ONDANSETRON 8 MG/1
4 TABLET, FILM COATED ORAL ONCE
Refills: 0 | Status: DISCONTINUED | OUTPATIENT
Start: 2021-09-17 | End: 2021-09-17

## 2021-09-17 RX ADMIN — HYDROMORPHONE HYDROCHLORIDE 0.5 MILLIGRAM(S): 2 INJECTION INTRAMUSCULAR; INTRAVENOUS; SUBCUTANEOUS at 10:25

## 2021-09-17 RX ADMIN — SODIUM CHLORIDE 50 MILLILITER(S): 9 INJECTION, SOLUTION INTRAVENOUS at 07:03

## 2021-09-17 RX ADMIN — HYDROMORPHONE HYDROCHLORIDE 0.5 MILLIGRAM(S): 2 INJECTION INTRAMUSCULAR; INTRAVENOUS; SUBCUTANEOUS at 10:40

## 2021-09-17 RX ADMIN — OXYCODONE AND ACETAMINOPHEN 1 TABLET(S): 5; 325 TABLET ORAL at 11:48

## 2021-09-17 RX ADMIN — OXYCODONE AND ACETAMINOPHEN 1 TABLET(S): 5; 325 TABLET ORAL at 12:15

## 2021-09-17 NOTE — ASU PATIENT PROFILE, ADULT - NSICDXPASTMEDICALHX_GEN_ALL_CORE_FT
PAST MEDICAL HISTORY:  Alcohol abuse     Anxiety     Arthritis     Depression     Hypothyroid     Insomnia     Migraine     Severe episode of recurrent major depressive disorder, with psychotic features

## 2022-01-04 NOTE — ED ADULT TRIAGE NOTE - NS ED NURSE AMBULANCES
Pt stated chest pain sob offered to come now she said she has other appts she has to go to. Offered this afternoon she has an appt somewhere around 3 she stated she will come around 1pm doesn't want to go to ER    ----- Message from Yanira Ricks sent at 1/4/2022  8:34 AM CST -----  Type:  Same Day Appointment Request  Caller is requesting a same day appointment.  Caller declined first available appointment listed below.    Name of Caller:  Patient  When is the first available appointment? 01/10/22  Symptoms:  b/p concerns  Best Call Back Number:  [phone]  Additional Information:  Pt requesting a call back for same day appt. Pt denied first available for 01/10/22.          [facility] No

## 2022-01-27 RX ORDER — LITHIUM CARBONATE 300 MG/1
0 TABLET, EXTENDED RELEASE ORAL
Qty: 0 | Refills: 0 | DISCHARGE

## 2022-01-27 RX ORDER — CLONAZEPAM 1 MG
1 TABLET ORAL
Qty: 0 | Refills: 0 | DISCHARGE

## 2022-01-27 RX ORDER — ADALIMUMAB 40MG/0.8ML
0 KIT SUBCUTANEOUS
Qty: 0 | Refills: 0 | DISCHARGE

## 2022-01-27 RX ORDER — LEVOTHYROXINE SODIUM 125 MCG
1 TABLET ORAL
Qty: 0 | Refills: 0 | DISCHARGE

## 2022-01-27 RX ORDER — FLUOXETINE HCL 10 MG
1 CAPSULE ORAL
Qty: 0 | Refills: 0 | DISCHARGE

## 2022-01-27 RX ORDER — SPIRONOLACTONE 25 MG/1
1 TABLET, FILM COATED ORAL
Qty: 0 | Refills: 0 | DISCHARGE

## 2022-01-27 RX ORDER — TOPIRAMATE 25 MG
1 TABLET ORAL
Qty: 0 | Refills: 0 | DISCHARGE

## 2022-01-27 RX ORDER — OXCARBAZEPINE 300 MG/1
1 TABLET, FILM COATED ORAL
Qty: 0 | Refills: 0 | DISCHARGE

## 2022-01-27 RX ORDER — ESCITALOPRAM OXALATE 10 MG/1
0 TABLET, FILM COATED ORAL
Qty: 0 | Refills: 0 | DISCHARGE

## 2022-01-27 RX ORDER — GABAPENTIN 400 MG/1
0 CAPSULE ORAL
Qty: 0 | Refills: 0 | DISCHARGE

## 2022-01-27 RX ORDER — LITHIUM CARBONATE 300 MG/1
1 TABLET, EXTENDED RELEASE ORAL
Qty: 0 | Refills: 0 | DISCHARGE

## 2022-01-27 RX ORDER — FLUOXETINE HCL 10 MG
0 CAPSULE ORAL
Qty: 0 | Refills: 0 | DISCHARGE

## 2022-01-27 RX ORDER — IBUPROFEN 200 MG
1 TABLET ORAL
Qty: 0 | Refills: 0 | DISCHARGE

## 2022-01-27 RX ORDER — TRAZODONE HCL 50 MG
0 TABLET ORAL
Qty: 0 | Refills: 0 | DISCHARGE

## 2022-02-10 NOTE — ASU PREOP CHECKLIST - SURGICAL CONSENT
Assessment and Plan:     Problem List Items Addressed This Visit        Cardiovascular and Mediastinum    Essential hypertension - Primary      Other Visit Diagnoses     Hypothyroidism, unspecified type        History of nocturia        Hyperlipidemia, unspecified hyperlipidemia type        BMI 28 0-28 9,adult        Hypoglycemia        Frequent urination        Medicare annual wellness visit, subsequent        Encounter for hepatitis C virus screening test for high risk patient               Preventive health issues were discussed with patient, and age appropriate screening tests were ordered as noted in patient's After Visit Summary  Personalized health advice and appropriate referrals for health education or preventive services given if needed, as noted in patient's After Visit Summary       History of Present Illness:     Patient presents for Medicare Annual Wellness visit    Patient Care Team:  Jeremie Freeman DO as PCP - General  Jeremie Freeman DO as PCP - PCP-[facility]  Genoveva Wiseman DO as Endoscopist     Problem List:     Patient Active Problem List   Diagnosis    Colon, diverticulosis    Erectile dysfunction of non-organic origin    Esophageal reflux    Essential hypertension    Osteoarthritis of knee    Lactose intolerance in adult    Polyp of colon    Age-related cataract of right eye    Pigmented skin lesion of uncertain nature    Pre-op examination    Cataract fragments in left eye following surgery      Past Medical and Surgical History:     Past Medical History:   Diagnosis Date    Age-related cataract of right eye 5/5/2021    Arthritis     Colon polyp     GERD (gastroesophageal reflux disease)     Hypertension     Pigmented skin lesion of uncertain nature 2/5/4317     Past Surgical History:   Procedure Laterality Date    BACK SURGERY      COLONOSCOPY      COLONOSCOPY N/A 10/30/2018    Procedure: COLONOSCOPY;  Surgeon: Nichole Boo DO; Location: [facility];   Service: Gastroenterology    EGD AND COLONOSCOPY N/A 4/10/2016    Procedure: EGD with food disimpaction;  Surgeon: Herman Kc MD;  Location:  MAIN OR;  Service:    [address] [place]      REPLACEMENT TOTAL KNEE      UPPER GASTROINTESTINAL ENDOSCOPY      WHIPPLE PROCEDURE W/ LAPAROSCOPY  2005    Proximal subtotal pancreatectomy      Family History:     Family History   Problem Relation Age of Onset    Hypertension Mother     Cancer Mother         Colon    Glaucoma Mother     Osteoporosis Mother     Coronary artery disease Father     Hyperlipidemia Father     Heart failure Father         Congestive    Diabetes Father     Hypertension Father       Social History:     Social History     Socioeconomic History    Marital status: /Civil Union     Spouse name: None    Number of children: None    Years of education: None    Highest education level: None   Occupational History    None   Tobacco Use    Smoking status: Never Smoker    Smokeless tobacco: Never Used   Vaping Use    Vaping Use: Never used   Substance and Sexual Activity    Alcohol use: Yes     Comment: occasionally    Drug use: Yes     Comment: CBD Oil    Sexual activity: Yes     Partners: Female   Other Topics Concern    None   Social History Narrative    None     Social Determinants of Health     Financial Resource Strain: Not on file   Food Insecurity: Not on file   Transportation Needs: Not on file   Physical Activity: Not on file   Stress: Not on file   Social Connections: Not on file   Intimate Partner Violence: Not on file   Housing Stability: Not on file      Medications and Allergies:     Current Outpatient Medications   Medication Sig Dispense Refill    Ascorbic Acid (vitamin C) 1000 MG tablet Take 1,000 mg by mouth daily      aspirin (ECOTRIN LOW STRENGTH) 81 mg EC tablet Take 1 tablet by mouth daily      Cholecalciferol (D3-1000 PO) Take by mouth      Creatine Monohydrate 1 5 GM/15ML LIQD Take 2 g by mouth daily (Patient not taking: Reported on 1/27/2022 )      Multiple Vitamins-Minerals (MULTIVITAMIN ADULTS 50+ PO) Take 1 tablet by mouth daily      olmesartan-hydrochlorothiazide (BENICAR HCT) 40-25 MG per tablet Take 1 tablet by mouth daily (Patient not taking: Reported on 1/27/2022 ) 90 tablet 3    Omega-3 Fatty Acids (FISH OIL) 1200 MG CAPS Take 1 tablet by mouth 2 (two) times a day      prednisoLONE acetate (PRED FORTE) 1 % ophthalmic suspension       sildenafil (VIAGRA) 100 mg tablet Take 1 tablet (100 mg total) by mouth as needed for erectile dysfunction 30 tablet 3    telmisartan-hydrochlorothiazide (MICARDIS HCT) 40-12 5 MG per tablet Take 1 tablet by mouth daily 10-25 mg      VITAMIN B COMPLEX-C PO Take by mouth       No current facility-administered medications for this visit  Allergies   Allergen Reactions    Dairy Aid [Lactase] Diarrhea    Morphine And Related Other (See Comments)     Category: increased pain;       Immunizations:     Immunization History   Administered Date(s) Administered    COVID-19 PFIZER VACCINE 0 3 ML IM 01/25/2021, 02/17/2021, 09/29/2021    INFLUENZA 12/10/2014, 12/30/2015, 08/31/2018, 09/24/2020, 10/30/2021    Influenza Quadrivalent 3 years and older 09/24/2020    Influenza Split High Dose Preservative Free IM 12/10/2014, 12/30/2015    Influenza, seasonal, injectable 10/31/2013    Pneumococcal Conjugate 13-Valent 12/30/2015    Pneumococcal Polysaccharide PPV23 09/24/2012    Tdap 07/22/2020    Zoster 06/08/2018    Zoster Vaccine Recombinant 08/31/2018    influenza, trivalent, adjuvanted 11/05/2019, 10/30/2021      Health Maintenance:         Topic Date Due    Hepatitis C Screening  Never done    Colorectal Cancer Screening  10/30/2023     There are no preventive care reminders to display for this patient     Medicare Health Risk Assessment:     Ht 5' 9" (1 753 m)   BMI 27 13 kg/m²      Kyaw Sharma is here for his Subsequent Wellness visit  Health Risk Assessment:   Patient rates overall health as very good  Patient feels that their physical health rating is slightly better  Patient is satisfied with their life  Hearing was rated as same  Patient feels that their emotional and mental health rating is same  Patients states they are never, rarely angry  Patient states they are sometimes unusually tired/fatigued  Pain experienced in the last 7 days has been some  Patient's pain rating has been 2/10  Patient states that he has experienced no weight loss or gain in last 6 months  Depression Screening:   PHQ-2 Score: 0      Fall Risk Screening: In the past year, patient has experienced: no history of falling in past year      Home Safety:  Patient does not have trouble with stairs inside or outside of their home  Patient has working smoke alarms and has working carbon monoxide detector  Home safety hazards include: none  Nutrition:   Current diet is Low Cholesterol, Low Saturated Fat and Low Carb  Medications:   Patient is currently taking over-the-counter supplements  OTC medications include: see medication list  Patient is able to manage medications  Activities of Daily Living (ADLs)/Instrumental Activities of Daily Living (IADLs):   Walk and transfer into and out of bed and chair?: Yes  Dress and groom yourself?: Yes    Bathe or shower yourself?: Yes    Feed yourself?  Yes  Do your laundry/housekeeping?: Yes  Manage your money, pay your bills and track your expenses?: Yes  Make your own meals?: Yes    Do your own shopping?: Yes    Previous Hospitalizations:   Any hospitalizations or ED visits within the last 12 months?: No      Advance Care Planning:   Living will: No    Durable POA for healthcare: No    Advanced directive: No    Five wishes given: Yes      Comments: Encouraged to execute living will and advanced directive    Cognitive Screening:   Provider or family/friend/caregiver concerned regarding cognition?: No    PREVENTIVE SCREENINGS      Cardiovascular Screening:    General: Screening Not Indicated and History Lipid Disorder      Colorectal Cancer Screening:     General: Screening Current      Prostate Cancer Screening:    General: Screening Not Indicated      Lung Cancer Screening:     General: Screening Not Indicated    Screening, Brief Intervention, and Referral to Treatment (SBIRT)    Screening  Typical number of drinks in a day: 0  Typical number of drinks in a week: 0  Interpretation: Low risk drinking behavior  Single Item Drug Screening:  How often have you used an illegal drug (including marijuana) or a prescription medication for non-medical reasons in the past year? daily or almost daily    Single Item Drug Screen Score: 4  Interpretation: POSITIVE screen for possible drug use disorder    Drug Abuse Screening Test (DAST-10):  1) Have you used drugs other than those required for medical reasons? Yes    Other Counseling Topics:   Regular weightbearing exercise  Wishing the patient is safe trip to [place]  Contact me if there any problems  Continue the current medical regimen    Maintain diet and exercise program      Dank Phillips, DO done

## 2022-06-15 ENCOUNTER — FORM ENCOUNTER (OUTPATIENT)
Age: 47
End: 2022-06-15

## 2022-07-08 NOTE — PROGRESS NOTE BEHAVIORAL HEALTH - NS ED BHA MSE SPEECH ARTICULATION
It was nice to see you.  Your should be able to view the lab results.    Your labs look very good, your blood salts and kidney tests.  Let me know if you have questions.    Rudy Vernon M.D.       Normal

## 2022-09-12 NOTE — DISCHARGE NOTE ADULT - MEDICATION SUMMARY - MEDICATIONS TO CHANGE
I will SWITCH the dose or number of times a day I take the medications listed below when I get home from the hospital:  None [Negative] : Heme/Lymph I will SWITCH the dose or number of times a day I take the medications listed below when I get home from the hospital:    traZODone 100 mg oral tablet  -- orally once a day (at bedtime)

## 2023-06-12 NOTE — ED ADULT TRIAGE NOTE - NS_BH TRG QUESTION5_ED_ALL_ED
No Detail Level: Detailed Cellcept Pregnancy And Lactation Text: This medication is Pregnancy Category D and isn't considered safe during pregnancy. It is unknown if this medication is excreted in breast milk.

## 2023-07-28 NOTE — H&P ADULT - PROBLEM SELECTOR PLAN 1
No Mildly elevated level, Mild Hypercalcemia & Hyponatremia   -HOLD Lithium , IV NS @ 60 cc/hr  Repeat Level in AM   -Dr Yadav Psych Eval Mildly elevated level, Mild Hypercalcemia & Hyponatremia , NO S/S of toxicity   -HOLD Lithium , IV NS @ 60 cc/hr  Repeat Level in AM   -Dr Yadav Psych Eval

## 2023-08-04 NOTE — ED ADULT NURSE NOTE - TEMPLATE LIST FOR HEAD TO TOE ASSESSMENT
Solaraze Counseling:  I discussed with the patient the risks of Solaraze including but not limited to erythema, scaling, itching, weeping, crusting, and pain. Humira Counseling:  I discussed with the patient the risks of adalimumab including but not limited to myelosuppression, immunosuppression, autoimmune hepatitis, demyelinating diseases, lymphoma, and serious infections.  The patient understands that monitoring is required including a PPD at baseline and must alert us or the primary physician if symptoms of infection or other concerning signs are noted. Dutasteride Male Counseling: Dustasteride Counseling:  I discussed with the patient the risks of use of dutasteride including but not limited to decreased libido, decreased ejaculate volume, and gynecomastia. Women who can become pregnant should not handle medication.  All of the patient's questions and concerns were addressed. Sotyktu Pregnancy And Lactation Text: There is insufficient data to evaluate whether or not Sotyktu is safe to use during pregnancy.   It is not known if Sotyktu passes into breast milk and whether or not it is safe to use when breastfeeding.   Prednisone Counseling:  I discussed with the patient the risks of prolonged use of prednisone including but not limited to weight gain, insomnia, osteoporosis, mood changes, diabetes, susceptibility to infection, glaucoma and high blood pressure.  In cases where prednisone use is prolonged, patients should be monitored with blood pressure checks, serum glucose levels and an eye exam.  Additionally, the patient may need to be placed on GI prophylaxis, PCP prophylaxis, and calcium and vitamin D supplementation and/or a bisphosphonate.  The patient verbalized understanding of the proper use and the possible adverse effects of prednisone.  All of the patient's questions and concerns were addressed. Opioid Counseling: I discussed with the patient the potential side effects of opioids including but not limited to addiction, altered mental status, and depression. I stressed avoiding alcohol, benzodiazepines, muscle relaxants and sleep aids unless specifically okayed by a physician. The patient verbalized understanding of the proper use and possible adverse effects of opioids. All of the patient's questions and concerns were addressed. They were instructed to flush the remaining pills down the toilet if they did not need them for pain. Doxepin Pregnancy And Lactation Text: This medication is Pregnancy Category C and it isn't known if it is safe during pregnancy. It is also excreted in breast milk and breast feeding isn't recommended. Valtrex Pregnancy And Lactation Text: this medication is Pregnancy Category B and is considered safe during pregnancy. This medication is not directly found in breast milk but it's metabolite acyclovir is present. Azathioprine Pregnancy And Lactation Text: This medication is Pregnancy Category D and isn't considered safe during pregnancy. It is unknown if this medication is excreted in breast milk. Sarecycline Counseling: Patient advised regarding possible photosensitivity and discoloration of the teeth, skin, lips, tongue and gums.  Patient instructed to avoid sunlight, if possible.  When exposed to sunlight, patients should wear protective clothing, sunglasses, and sunscreen.  The patient was instructed to call the office immediately if the following severe adverse effects occur:  hearing changes, easy bruising/bleeding, severe headache, or vision changes.  The patient verbalized understanding of the proper use and possible adverse effects of sarecycline.  All of the patient's questions and concerns were addressed. High Dose Vitamin A Counseling: Side effects reviewed, pt to contact office should one occur. Xolair Counseling:  Patient informed of potential adverse effects including but not limited to fever, muscle aches, rash and allergic reactions.  The patient verbalized understanding of the proper use and possible adverse effects of Xolair.  All of the patient's questions and concerns were addressed. Mirvaso Pregnancy And Lactation Text: This medication has not been assigned a Pregnancy Risk Category. It is unknown if the medication is excreted in breast milk. Adbry Pregnancy And Lactation Text: It is unknown if this medication will adversely affect pregnancy or breast feeding. Dapsone Pregnancy And Lactation Text: This medication is Pregnancy Category C and is not considered safe during pregnancy or breast feeding. Propranolol Counseling:  I discussed with the patient the risks of propranolol including but not limited to low heart rate, low blood pressure, low blood sugar, restlessness and increased cold sensitivity. They should call the office if they experience any of these side effects. Include Pregnancy/Lactation Warning?: No Psych/Behavioral Simponi Pregnancy And Lactation Text: The risk during pregnancy and breastfeeding is uncertain with this medication. Erythromycin Pregnancy And Lactation Text: This medication is Pregnancy Category B and is considered safe during pregnancy. It is also excreted in breast milk. Nsaids Pregnancy And Lactation Text: These medications are considered safe up to 30 weeks gestation. It is excreted in breast milk. Itraconazole Pregnancy And Lactation Text: This medication is Pregnancy Category C and it isn't know if it is safe during pregnancy. It is also excreted in breast milk. Eucrisa Counseling: Patient may experience a mild burning sensation during topical application. Eucrisa is not approved in children less than 2 years of age. Prednisone Pregnancy And Lactation Text: This medication is Pregnancy Category C and it isn't know if it is safe during pregnancy. This medication is excreted in breast milk. Opioid Pregnancy And Lactation Text: These medications can lead to premature delivery and should be avoided during pregnancy. These medications are also present in breast milk in small amounts. Humira Pregnancy And Lactation Text: This medication is Pregnancy Category B and is considered safe during pregnancy. It is unknown if this medication is excreted in breast milk. Carac Pregnancy And Lactation Text: This medication is Pregnancy Category X and contraindicated in pregnancy and in women who may become pregnant. It is unknown if this medication is excreted in breast milk. Solaraze Pregnancy And Lactation Text: This medication is Pregnancy Category B and is considered safe. There is some data to suggest avoiding during the third trimester. It is unknown if this medication is excreted in breast milk. Cellcept Counseling:  I discussed with the patient the risks of mycophenolate mofetil including but not limited to infection/immunosuppression, GI upset, hypokalemia, hypercholesterolemia, bone marrow suppression, lymphoproliferative disorders, malignancy, GI ulceration/bleed/perforation, colitis, interstitial lung disease, kidney failure, progressive multifocal leukoencephalopathy, and birth defects.  The patient understands that monitoring is required including a baseline creatinine and regular CBC testing. In addition, patient must alert us immediately if symptoms of infection or other concerning signs are noted. Topical Sulfur Applications Counseling: Topical Sulfur Counseling: Patient counseled that this medication may cause skin irritation or allergic reactions.  In the event of skin irritation, the patient was advised to reduce the amount of the drug applied or use it less frequently.   The patient verbalized understanding of the proper use and possible adverse effects of topical sulfur application.  All of the patient's questions and concerns were addressed. Dutasteride Pregnancy And Lactation Text: This medication is absolutely contraindicated in women, especially during pregnancy and breast feeding. Feminization of male fetuses is possible if taking while pregnant. Hydroxyzine Counseling: Patient advised that the medication is sedating and not to drive a car after taking this medication.  Patient informed of potential adverse effects including but not limited to dry mouth, urinary retention, and blurry vision.  The patient verbalized understanding of the proper use and possible adverse effects of hydroxyzine.  All of the patient's questions and concerns were addressed. Xeljanz Counseling: I discussed with the patient the risks of Xeljanz therapy including increased risk of infection, liver issues, headache, diarrhea, or cold symptoms. Live vaccines should be avoided. They were instructed to call if they have any problems. Gabapentin Counseling: I discussed with the patient the risks of gabapentin including but not limited to dizziness, somnolence, fatigue and ataxia. Xolair Pregnancy And Lactation Text: This medication is Pregnancy Category B and is considered safe during pregnancy. This medication is excreted in breast milk. High Dose Vitamin A Pregnancy And Lactation Text: High dose vitamin A therapy is contraindicated during pregnancy and breast feeding. Cimzia Counseling:  I discussed with the patient the risks of Cimzia including but not limited to immunosuppression, allergic reactions and infections.  The patient understands that monitoring is required including a PPD at baseline and must alert us or the primary physician if symptoms of infection or other concerning signs are noted. Sarecycline Pregnancy And Lactation Text: This medication is Pregnancy Category D and not consider safe during pregnancy. It is also excreted in breast milk. Opzelura Counseling:  I discussed with the patient the risks of Opzelura including but not limited to nasopharngitis, bronchitis, ear infection, eosinophila, hives, diarrhea, folliculitis, tonsillitis, and rhinorrhea.  Taken orally, this medication has been linked to serious infections; higher rate of mortality; malignancy and lymphoproliferative disorders; major adverse cardiovascular events; thrombosis; thrombocytopenia, anemia, and neutropenia; and lipid elevations. Propranolol Pregnancy And Lactation Text: This medication is Pregnancy Category C and it isn't known if it is safe during pregnancy. It is excreted in breast milk. Eucrisa Pregnancy And Lactation Text: This medication has not been assigned a Pregnancy Risk Category but animal studies failed to show danger with the topical medication. It is unknown if the medication is excreted in breast milk. Olanzapine Counseling- I discussed with the patient the common side effects of olanzapine including but are not limited to: lack of energy, dry mouth, increased appetite, sleepiness, tremor, constipation, dizziness, changes in behavior, or restlessness.  Explained that teenagers are more likely to experience headaches, abdominal pain, pain in the arms or legs, tiredness, and sleepiness.  Serious side effects include but are not limited: increased risk of death in elderly patients who are confused, have memory loss, or dementia-related psychosis; hyperglycemia; increased cholesterol and triglycerides; and weight gain. Skyrizi Counseling: I discussed with the patient the risks of risankizumab-rzaa including but not limited to immunosuppression, and serious infections.  The patient understands that monitoring is required including a PPD at baseline and must alert us or the primary physician if symptoms of infection or other concerning signs are noted. Ketoconazole Counseling:   Patient counseled regarding improving absorption with orange juice.  Adverse effects include but are not limited to breast enlargement, headache, diarrhea, nausea, upset stomach, liver function test abnormalities, taste disturbance, and stomach pain.  There is a rare possibility of liver failure that can occur when taking ketoconazole. The patient understands that monitoring of LFTs may be required, especially at baseline. The patient verbalized understanding of the proper use and possible adverse effects of ketoconazole.  All of the patient's questions and concerns were addressed. Topical Sulfur Applications Pregnancy And Lactation Text: This medication is Pregnancy Category C and has an unknown safety profile during pregnancy. It is unknown if this topical medication is excreted in breast milk. Hydroxyzine Pregnancy And Lactation Text: This medication is not safe during pregnancy and should not be taken. It is also excreted in breast milk and breast feeding isn't recommended. Metronidazole Counseling:  I discussed with the patient the risks of metronidazole including but not limited to seizures, nausea/vomiting, a metallic taste in the mouth, nausea/vomiting and severe allergy. Bactrim Pregnancy And Lactation Text: This medication is Pregnancy Category D and is known to cause fetal risk.  It is also excreted in breast milk. Calcipotriene Counseling:  I discussed with the patient the risks of calcipotriene including but not limited to erythema, scaling, itching, and irritation. Finasteride Male Counseling: Finasteride Counseling:  I discussed with the patient the risks of use of finasteride including but not limited to decreased libido, decreased ejaculate volume, gynecomastia, and depression. Women should not handle medication.  All of the patient's questions and concerns were addressed. Gabapentin Pregnancy And Lactation Text: This medication is Pregnancy Category C and isn't considered safe during pregnancy. It is excreted in breast milk. Cimzia Pregnancy And Lactation Text: This medication crosses the placenta but can be considered safe in certain situations. Cimzia may be excreted in breast milk. Topical Retinoid counseling:  Patient advised to apply a pea-sized amount only at bedtime and wait 30 minutes after washing their face before applying.  If too drying, patient may add a non-comedogenic moisturizer. The patient verbalized understanding of the proper use and possible adverse effects of retinoids.  All of the patient's questions and concerns were addressed. Ilumya Counseling: I discussed with the patient the risks of tildrakizumab including but not limited to immunosuppression, malignancy, posterior leukoencephalopathy syndrome, and serious infections.  The patient understands that monitoring is required including a PPD at baseline and must alert us or the primary physician if symptoms of infection or other concerning signs are noted. Xellinneaz Pregnancy And Lactation Text: This medication is Pregnancy Category D and is not considered safe during pregnancy.  The risk during breast feeding is also uncertain. Cibinqo Counseling: I discussed with the patient the risks of Cibinqo therapy including but not limited to common cold, nausea, headache, cold sores, increased blood CPK levels, dizziness, UTIs, fatigue, acne, and vomitting. Live vaccines should be avoided.  This medication has been linked to serious infections; higher rate of mortality; malignancy and lymphoproliferative disorders; major adverse cardiovascular events; thrombosis; thrombocytopenia and lymphopenia; lipid elevations; and retinal detachment. Tetracycline Counseling: Patient counseled regarding possible photosensitivity and increased risk for sunburn.  Patient instructed to avoid sunlight, if possible.  When exposed to sunlight, patients should wear protective clothing, sunglasses, and sunscreen.  The patient was instructed to call the office immediately if the following severe adverse effects occur:  hearing changes, easy bruising/bleeding, severe headache, or vision changes.  The patient verbalized understanding of the proper use and possible adverse effects of tetracycline.  All of the patient's questions and concerns were addressed. Patient understands to avoid pregnancy while on therapy due to potential birth defects. SSKI Counseling:  I discussed with the patient the risks of SSKI including but not limited to thyroid abnormalities, metallic taste, GI upset, fever, headache, acne, arthralgias, paraesthesias, lymphadenopathy, easy bleeding, arrhythmias, and allergic reaction. Opzelura Pregnancy And Lactation Text: There is insufficient data to evaluate drug-associated risk for major birth defects, miscarriage, or other adverse maternal or fetal outcomes.  There is a pregnancy registry that monitors pregnancy outcomes in pregnant persons exposed to the medication during pregnancy.  It is unknown if this medication is excreted in breast milk.  Do not breastfeed during treatment and for about 4 weeks after the last dose. Metronidazole Pregnancy And Lactation Text: This medication is Pregnancy Category B and considered safe during pregnancy.  It is also excreted in breast milk. Hydroquinone Counseling:  Patient advised that medication may result in skin irritation, lightening (hypopigmentation), dryness, and burning.  In the event of skin irritation, the patient was advised to reduce the amount of the drug applied or use it less frequently.  Rarely, spots that are treated with hydroquinone can become darker (pseudoochronosis).  Should this occur, patient instructed to stop medication and call the office. The patient verbalized understanding of the proper use and possible adverse effects of hydroquinone.  All of the patient's questions and concerns were addressed. Ketoconazole Pregnancy And Lactation Text: This medication is Pregnancy Category C and it isn't know if it is safe during pregnancy. It is also excreted in breast milk and breast feeding isn't recommended. Wartpeel Counseling:  I discussed with the patient the risks of Wartpeel including but not limited to erythema, scaling, itching, weeping, crusting, and pain. Olanzapine Pregnancy And Lactation Text: This medication is pregnancy category C.   There are no adequate and well controlled trials with olanzapine in pregnant females.  Olanzapine should be used during pregnancy only if the potential benefit justifies the potential risk to the fetus.   In a study in lactating healthy women, olanzapine was excreted in breast milk.  It is recommended that women taking olanzapine should not breast feed. Arava Counseling:  Patient counseled regarding adverse effects of Arava including but not limited to nausea, vomiting, abnormalities in liver function tests. Patients may develop mouth sores, rash, diarrhea, and abnormalities in blood counts. The patient understands that monitoring is required including LFTs and blood counts.  There is a rare possibility of scarring of the liver and lung problems that can occur when taking methotrexate. Persistent nausea, loss of appetite, pale stools, dark urine, cough, and shortness of breath should be reported immediately. Patient advised to discontinue Arava treatment and consult with a physician prior to attempting conception. The patient will have to undergo a treatment to eliminate Arava from the body prior to conception. Topical Retinoid Pregnancy And Lactation Text: This medication is Pregnancy Category C. It is unknown if this medication is excreted in breast milk. Azelaic Acid Counseling: Patient counseled that medicine may cause skin irritation and to avoid applying near the eyes.  In the event of skin irritation, the patient was advised to reduce the amount of the drug applied or use it less frequently.   The patient verbalized understanding of the proper use and possible adverse effects of azelaic acid.  All of the patient's questions and concerns were addressed. Finasteride Pregnancy And Lactation Text: This medication is absolutely contraindicated during pregnancy. It is unknown if it is excreted in breast milk. Erivedge Counseling- I discussed with the patient the risks of Erivedge including but not limited to nausea, vomiting, diarrhea, constipation, weight loss, changes in the sense of taste, decreased appetite, muscle spasms, and hair loss.  The patient verbalized understanding of the proper use and possible adverse effects of Erivedge.  All of the patient's questions and concerns were addressed. Cephalexin Counseling: I counseled the patient regarding use of cephalexin as an antibiotic for prophylactic and/or therapeutic purposes. Cephalexin (commonly prescribed under brand name Keflex) is a cephalosporin antibiotic which is active against numerous classes of bacteria, including most skin bacteria. Side effects may include nausea, diarrhea, gastrointestinal upset, rash, hives, yeast infections, and in rare cases, hepatitis, kidney disease, seizures, fever, confusion, neurologic symptoms, and others. Patients with severe allergies to penicillin medications are cautioned that there is about a 10% incidence of cross-reactivity with cephalosporins. When possible, patients with penicillin allergies should use alternatives to cephalosporins for antibiotic therapy. Calcipotriene Pregnancy And Lactation Text: This medication has not been proven safe during pregnancy. It is unknown if this medication is excreted in breast milk. Cyclophosphamide Counseling:  I discussed with the patient the risks of cyclophosphamide including but not limited to hair loss, hormonal abnormalities, decreased fertility, abdominal pain, diarrhea, nausea and vomiting, bone marrow suppression and infection. The patient understands that monitoring is required while taking this medication. Cosentyx Counseling:  I discussed with the patient the risks of Cosentyx including but not limited to worsening of Crohn's disease, immunosuppression, allergic reactions and infections.  The patient understands that monitoring is required including a PPD at baseline and must alert us or the primary physician if symptoms of infection or other concerning signs are noted. Picato Counseling:  I discussed with the patient the risks of Picato including but not limited to erythema, scaling, itching, weeping, crusting, and pain. Sski Pregnancy And Lactation Text: This medication is Pregnancy Category D and isn't considered safe during pregnancy. It is excreted in breast milk. Glycopyrrolate Counseling:  I discussed with the patient the risks of glycopyrrolate including but not limited to skin rash, drowsiness, dry mouth, difficulty urinating, and blurred vision. Cibinqo Pregnancy And Lactation Text: It is unknown if this medication will adversely affect pregnancy or breast feeding.  You should not take this medication if you are currently pregnant or planning a pregnancy or while breastfeeding. Arava Pregnancy And Lactation Text: This medication is Pregnancy Category X and is absolutely contraindicated during pregnancy. It is unknown if it is excreted in breast milk. Albendazole Counseling:  I discussed with the patient the risks of albendazole including but not limited to cytopenia, kidney damage, nausea/vomiting and severe allergy.  The patient understands that this medication is being used in an off-label manner. Stelara Counseling:  I discussed with the patient the risks of ustekinumab including but not limited to immunosuppression, malignancy, posterior leukoencephalopathy syndrome, and serious infections.  The patient understands that monitoring is required including a PPD at baseline and must alert us or the primary physician if symptoms of infection or other concerning signs are noted. Minocycline Counseling: Patient advised regarding possible photosensitivity and discoloration of the teeth, skin, lips, tongue and gums.  Patient instructed to avoid sunlight, if possible.  When exposed to sunlight, patients should wear protective clothing, sunglasses, and sunscreen.  The patient was instructed to call the office immediately if the following severe adverse effects occur:  hearing changes, easy bruising/bleeding, severe headache, or vision changes.  The patient verbalized understanding of the proper use and possible adverse effects of minocycline.  All of the patient's questions and concerns were addressed. Acitretin Counseling:  I discussed with the patient the risks of acitretin including but not limited to hair loss, dry lips/skin/eyes, liver damage, hyperlipidemia, depression/suicidal ideation, photosensitivity.  Serious rare side effects can include but are not limited to pancreatitis, pseudotumor cerebri, bony changes, clot formation/stroke/heart attack.  Patient understands that alcohol is contraindicated since it can result in liver toxicity and significantly prolong the elimination of the drug by many years. Oral Minoxidil Counseling- I discussed with the patient the risks of oral minoxidil including but not limited to shortness of breath, swelling of the feet or ankles, dizziness, lightheadedness, unwanted hair growth and allergic reaction.  The patient verbalized understanding of the proper use and possible adverse effects of oral minoxidil.  All of the patient's questions and concerns were addressed. Cephalexin Pregnancy And Lactation Text: This medication is Pregnancy Category B and considered safe during pregnancy.  It is also excreted in breast milk but can be used safely for shorter doses. Infliximab Counseling:  I discussed with the patient the risks of infliximab including but not limited to myelosuppression, immunosuppression, autoimmune hepatitis, demyelinating diseases, lymphoma, and serious infections.  The patient understands that monitoring is required including a PPD at baseline and must alert us or the primary physician if symptoms of infection or other concerning signs are noted. 5-Fu Counseling: 5-Fluorouracil Counseling:  I discussed with the patient the risks of 5-fluorouracil including but not limited to erythema, scaling, itching, weeping, crusting, and pain. Terbinafine Counseling: Patient counseling regarding adverse effects of terbinafine including but not limited to headache, diarrhea, rash, upset stomach, liver function test abnormalities, itching, taste/smell disturbance, nausea, abdominal pain, and flatulence.  There is a rare possibility of liver failure that can occur when taking terbinafine.  The patient understands that a baseline LFT and kidney function test may be required. The patient verbalized understanding of the proper use and possible adverse effects of terbinafine.  All of the patient's questions and concerns were addressed. Azelaic Acid Pregnancy And Lactation Text: This medication is considered safe during pregnancy and breast feeding. Birth Control Pills Counseling: Birth Control Pill Counseling: I discussed with the patient the potential side effects of OCPs including but not limited to increased risk of stroke, heart attack, thrombophlebitis, deep venous thrombosis, hepatic adenomas, breast changes, GI upset, headaches, and depression.  The patient verbalized understanding of the proper use and possible adverse effects of OCPs. All of the patient's questions and concerns were addressed. Tazorac Counseling:  Patient advised that medication is irritating and drying.  Patient may need to apply sparingly and wash off after an hour before eventually leaving it on overnight.  The patient verbalized understanding of the proper use and possible adverse effects of tazorac.  All of the patient's questions and concerns were addressed. Cyclophosphamide Pregnancy And Lactation Text: This medication is Pregnancy Category D and it isn't considered safe during pregnancy. This medication is excreted in breast milk. Albendazole Pregnancy And Lactation Text: This medication is Pregnancy Category C and it isn't known if it is safe during pregnancy. It is also excreted in breast milk. Thalidomide Counseling: I discussed with the patient the risks of thalidomide including but not limited to birth defects, anxiety, weakness, chest pain, dizziness, cough and severe allergy. Clofazimine Counseling:  I discussed with the patient the risks of clofazimine including but not limited to skin and eye pigmentation, liver damage, nausea/vomiting, gastrointestinal bleeding and allergy. Acitretin Pregnancy And Lactation Text: This medication is Pregnancy Category X and should not be given to women who are pregnant or may become pregnant in the future. This medication is excreted in breast milk. Glycopyrrolate Pregnancy And Lactation Text: This medication is Pregnancy Category B and is considered safe during pregnancy. It is unknown if it is excreted breast milk. Olumiant Counseling: I discussed with the patient the risks of Olumiant therapy including but not limited to upper respiratory tract infections, shingles, cold sores, and nausea. Live vaccines should be avoided.  This medication has been linked to serious infections; higher rate of mortality; malignancy and lymphoproliferative disorders; major adverse cardiovascular events; thrombosis; gastrointestinal perforations; neutropenia; lymphopenia; anemia; liver enzyme elevations; and lipid elevations. Winlevi Counseling:  I discussed with the patient the risks of topical clascoterone including but not limited to erythema, scaling, itching, and stinging. Patient voiced their understanding. Oral Minoxidil Pregnancy And Lactation Text: This medication should only be used when clearly needed if you are pregnant, attempting to become pregnant or breast feeding. Imiquimod Counseling:  I discussed with the patient the risks of imiquimod including but not limited to erythema, scaling, itching, weeping, crusting, and pain.  Patient understands that the inflammatory response to imiquimod is variable from person to person and was educated regarded proper titration schedule.  If flu-like symptoms develop, patient knows to discontinue the medication and contact us. Terbinafine Pregnancy And Lactation Text: This medication is Pregnancy Category B and is considered safe during pregnancy. It is also excreted in breast milk and breast feeding isn't recommended. Libtayo Counseling- I discussed with the patient the risks of Libtayo including but not limited to nausea, vomiting, diarrhea, and bone or muscle pain.  The patient verbalized understanding of the proper use and possible adverse effects of Libtayo.  All of the patient's questions and concerns were addressed. Clindamycin Counseling: I counseled the patient regarding use of clindamycin as an antibiotic for prophylactic and/or therapeutic purposes. Clindamycin is active against numerous classes of bacteria, including skin bacteria. Side effects may include nausea, diarrhea, gastrointestinal upset, rash, hives, yeast infections, and in rare cases, colitis. Fluconazole Counseling:  Patient counseled regarding adverse effects of fluconazole including but not limited to headache, diarrhea, nausea, upset stomach, liver function test abnormalities, taste disturbance, and stomach pain.  There is a rare possibility of liver failure that can occur when taking fluconazole.  The patient understands that monitoring of LFTs and kidney function test may be required, especially at baseline. The patient verbalized understanding of the proper use and possible adverse effects of fluconazole.  All of the patient's questions and concerns were addressed. Benzoyl Peroxide Counseling: Patient counseled that medicine may cause skin irritation and bleach clothing.  In the event of skin irritation, the patient was advised to reduce the amount of the drug applied or use it less frequently.   The patient verbalized understanding of the proper use and possible adverse effects of benzoyl peroxide.  All of the patient's questions and concerns were addressed. Tazorac Pregnancy And Lactation Text: This medication is not safe during pregnancy. It is unknown if this medication is excreted in breast milk. Birth Control Pills Pregnancy And Lactation Text: This medication should be avoided if pregnant and for the first 30 days post-partum. Protopic Counseling: Patient may experience a mild burning sensation during topical application. Protopic is not approved in children less than 2 years of age. There have been case reports of hematologic and skin malignancies in patients using topical calcineurin inhibitors although causality is questionable. Hydroxychloroquine Counseling:  I discussed with the patient that a baseline ophthalmologic exam is needed at the start of therapy and every year thereafter while on therapy. A CBC may also be warranted for monitoring.  The side effects of this medication were discussed with the patient, including but not limited to agranulocytosis, aplastic anemia, seizures, rashes, retinopathy, and liver toxicity. Patient instructed to call the office should any adverse effect occur.  The patient verbalized understanding of the proper use and possible adverse effects of Plaquenil.  All the patient's questions and concerns were addressed. Dupixent Counseling: I discussed with the patient the risks of dupilumab including but not limited to eye infection and irritation, cold sores, injection site reactions, worsening of asthma, allergic reactions and increased risk of parasitic infection.  Live vaccines should be avoided while taking dupilumab. Dupilumab will also interact with certain medications such as warfarin and cyclosporine. The patient understands that monitoring is required and they must alert us or the primary physician if symptoms of infection or other concerning signs are noted. Cyclosporine Counseling:  I discussed with the patient the risks of cyclosporine including but not limited to hypertension, gingival hyperplasia,myelosuppression, immunosuppression, liver damage, kidney damage, neurotoxicity, lymphoma, and serious infections. The patient understands that monitoring is required including baseline blood pressure, CBC, CMP, lipid panel and uric acid, and then 1-2 times monthly CMP and blood pressure. Bexarotene Counseling:  I discussed with the patient the risks of bexarotene including but not limited to hair loss, dry lips/skin/eyes, liver abnormalities, hyperlipidemia, pancreatitis, depression/suicidal ideation, photosensitivity, drug rash/allergic reactions, hypothyroidism, anemia, leukopenia, infection, cataracts, and teratogenicity.  Patient understands that they will need regular blood tests to check lipid profile, liver function tests, white blood cell count, thyroid function tests and pregnancy test if applicable. Taltz Counseling: I discussed with the patient the risks of ixekizumab including but not limited to immunosuppression, serious infections, worsening of inflammatory bowel disease and drug reactions.  The patient understands that monitoring is required including a PPD at baseline and must alert us or the primary physician if symptoms of infection or other concerning signs are noted. Olumiant Pregnancy And Lactation Text: Based on animal studies, Olumiant may cause embryo-fetal harm when administered to pregnant women.  The medication should not be used in pregnancy.  Breastfeeding is not recommended during treatment. Winlevi Pregnancy And Lactation Text: This medication is considered safe during pregnancy and breastfeeding. Otezla Counseling: The side effects of Otezla were discussed with the patient, including but not limited to worsening or new depression, weight loss, diarrhea, nausea, upper respiratory tract infection, and headache. Patient instructed to call the office should any adverse effect occur.  The patient verbalized understanding of the proper use and possible adverse effects of Otezla.  All the patient's questions and concerns were addressed. Ivermectin Counseling:  Patient instructed to take medication on an empty stomach with a full glass of water.  Patient informed of potential adverse effects including but not limited to nausea, diarrhea, dizziness, itching, and swelling of the extremities or lymph nodes.  The patient verbalized understanding of the proper use and possible adverse effects of ivermectin.  All of the patient's questions and concerns were addressed. Benzoyl Peroxide Pregnancy And Lactation Text: This medication is Pregnancy Category C. It is unknown if benzoyl peroxide is excreted in breast milk. Libtayo Pregnancy And Lactation Text: This medication is contraindicated in pregnancy and when breast feeding. Quinolones Counseling:  I discussed with the patient the risks of fluoroquinolones including but not limited to GI upset, allergic reaction, drug rash, diarrhea, dizziness, photosensitivity, yeast infections, liver function test abnormalities, tendonitis/tendon rupture. Clindamycin Pregnancy And Lactation Text: This medication can be used in pregnancy if certain situations. Clindamycin is also present in breast milk. Drysol Counseling:  I discussed with the patient the risks of drysol/aluminum chloride including but not limited to skin rash, itching, irritation, burning. Azithromycin Counseling:  I discussed with the patient the risks of azithromycin including but not limited to GI upset, allergic reaction, drug rash, diarrhea, and yeast infections. Dupixent Pregnancy And Lactation Text: This medication likely crosses the placenta but the risk for the fetus is uncertain. This medication is excreted in breast milk. Spironolactone Counseling: Patient advised regarding risks of diarrhea, abdominal pain, hyperkalemia, birth defects (for female patients), liver toxicity and renal toxicity. The patient may need blood work to monitor liver and kidney function and potassium levels while on therapy. The patient verbalized understanding of the proper use and possible adverse effects of spironolactone.  All of the patient's questions and concerns were addressed. Protopic Pregnancy And Lactation Text: This medication is Pregnancy Category C. It is unknown if this medication is excreted in breast milk when applied topically. Hydroxychloroquine Pregnancy And Lactation Text: This medication has been shown to cause fetal harm but it isn't assigned a Pregnancy Risk Category. There are small amounts excreted in breast milk. Rituxan Counseling:  I discussed with the patient the risks of Rituxan infusions. Side effects can include infusion reactions, severe drug rashes including mucocutaneous reactions, reactivation of latent hepatitis and other infections and rarely progressive multifocal leukoencephalopathy.  All of the patient's questions and concerns were addressed. Topical Clindamycin Counseling: Patient counseled that this medication may cause skin irritation or allergic reactions.  In the event of skin irritation, the patient was advised to reduce the amount of the drug applied or use it less frequently.   The patient verbalized understanding of the proper use and possible adverse effects of clindamycin.  All of the patient's questions and concerns were addressed. Cimetidine Counseling:  I discussed with the patient the risks of Cimetidine including but not limited to gynecomastia, headache, diarrhea, nausea, drowsiness, arrhythmias, pancreatitis, skin rashes, psychosis, bone marrow suppression and kidney toxicity. Rinvoq Counseling: I discussed with the patient the risks of Rinvoq therapy including but not limited to upper respiratory tract infections, shingles, cold sores, bronchitis, nausea, cough, fever, acne, and headache. Live vaccines should be avoided.  This medication has been linked to serious infections; higher rate of mortality; malignancy and lymphoproliferative disorders; major adverse cardiovascular events; thrombosis; thrombocytopenia, anemia, and neutropenia; lipid elevations; liver enzyme elevations; and gastrointestinal perforations. Colchicine Counseling:  Patient counseled regarding adverse effects including but not limited to stomach upset (nausea, vomiting, stomach pain, or diarrhea).  Patient instructed to limit alcohol consumption while taking this medication.  Colchicine may reduce blood counts especially with prolonged use.  The patient understands that monitoring of kidney function and blood counts may be required, especially at baseline. The patient verbalized understanding of the proper use and possible adverse effects of colchicine.  All of the patient's questions and concerns were addressed. Tranexamic Acid Counseling:  Patient advised of the small risk of bleeding problems with tranexamic acid. They were also instructed to call if they developed any nausea, vomiting or diarrhea. All of the patient's questions and concerns were addressed. Bexarotene Pregnancy And Lactation Text: This medication is Pregnancy Category X and should not be given to women who are pregnant or may become pregnant. This medication should not be used if you are breast feeding. Odomzo Counseling- I discussed with the patient the risks of Odomzo including but not limited to nausea, vomiting, diarrhea, constipation, weight loss, changes in the sense of taste, decreased appetite, muscle spasms, and hair loss.  The patient verbalized understanding of the proper use and possible adverse effects of Odomzo.  All of the patient's questions and concerns were addressed. Minoxidil Counseling: Minoxidil is a topical medication which can increase blood flow where it is applied. It is uncertain how this medication increases hair growth. Side effects are uncommon and include stinging and allergic reactions. Zyclara Counseling:  I discussed with the patient the risks of imiquimod including but not limited to erythema, scaling, itching, weeping, crusting, and pain.  Patient understands that the inflammatory response to imiquimod is variable from person to person and was educated regarded proper titration schedule.  If flu-like symptoms develop, patient knows to discontinue the medication and contact us. Otezla Pregnancy And Lactation Text: This medication is Pregnancy Category C and it isn't known if it is safe during pregnancy. It is unknown if it is excreted in breast milk. Azithromycin Pregnancy And Lactation Text: This medication is considered safe during pregnancy and is also secreted in breast milk. Carac Counseling:  I discussed with the patient the risks of Carac including but not limited to erythema, scaling, itching, weeping, crusting, and pain. Griseofulvin Counseling:  I discussed with the patient the risks of griseofulvin including but not limited to photosensitivity, cytopenia, liver damage, nausea/vomiting and severe allergy.  The patient understands that this medication is best absorbed when taken with a fatty meal (e.g., ice cream or french fries). Topical Clindamycin Pregnancy And Lactation Text: This medication is Pregnancy Category B and is considered safe during pregnancy. It is unknown if it is excreted in breast milk. Rituxan Pregnancy And Lactation Text: This medication is Pregnancy Category C and it isn't know if it is safe during pregnancy. It is unknown if this medication is excreted in breast milk but similar antibodies are known to be excreted. Spironolactone Pregnancy And Lactation Text: This medication can cause feminization of the male fetus and should be avoided during pregnancy. The active metabolite is also found in breast milk. Doxycycline Counseling:  Patient counseled regarding possible photosensitivity and increased risk for sunburn.  Patient instructed to avoid sunlight, if possible.  When exposed to sunlight, patients should wear protective clothing, sunglasses, and sunscreen.  The patient was instructed to call the office immediately if the following severe adverse effects occur:  hearing changes, easy bruising/bleeding, severe headache, or vision changes.  The patient verbalized understanding of the proper use and possible adverse effects of doxycycline.  All of the patient's questions and concerns were addressed. Niacinamide Counseling: I recommended taking niacin or niacinamide, also know as vitamin B3, twice daily. Recent evidence suggests that taking vitamin B3 (500 mg twice daily) can reduce the risk of actinic keratoses and non-melanoma skin cancers. Side effects of vitamin B3 include flushing and headache. Methotrexate Counseling:  Patient counseled regarding adverse effects of methotrexate including but not limited to nausea, vomiting, abnormalities in liver function tests. Patients may develop mouth sores, rash, diarrhea, and abnormalities in blood counts. The patient understands that monitoring is required including LFT's and blood counts.  There is a rare possibility of scarring of the liver and lung problems that can occur when taking methotrexate. Persistent nausea, loss of appetite, pale stools, dark urine, cough, and shortness of breath should be reported immediately. Patient advised to discontinue methotrexate treatment at least three months before attempting to become pregnant.  I discussed the need for folate supplements while taking methotrexate.  These supplements can decrease side effects during methotrexate treatment. The patient verbalized understanding of the proper use and possible adverse effects of methotrexate.  All of the patient's questions and concerns were addressed. Rhofade Counseling: Rhofade is a topical medication which can decrease superficial blood flow where applied. Side effects are uncommon and include stinging, redness and allergic reactions. Low Dose Naltrexone Counseling- I discussed with the patient the potential risks and side effects of low dose naltrexone including but not limited to: more vivid dreams, headaches, nausea, vomiting, abdominal pain, fatigue, dizziness, and anxiety. Tranexamic Acid Pregnancy And Lactation Text: It is unknown if this medication is safe during pregnancy or breast feeding. Enbrel Counseling:  I discussed with the patient the risks of etanercept including but not limited to myelosuppression, immunosuppression, autoimmune hepatitis, demyelinating diseases, lymphoma, and infections.  The patient understands that monitoring is required including a PPD at baseline and must alert us or the primary physician if symptoms of infection or other concerning signs are noted. Rinvoq Pregnancy And Lactation Text: Based on animal studies, Rinvoq may cause embryo-fetal harm when administered to pregnant women.  The medication should not be used in pregnancy.  Breastfeeding is not recommended during treatment and for 6 days after the last dose. Tremfya Counseling: I discussed with the patient the risks of guselkumab including but not limited to immunosuppression, serious infections, worsening of inflammatory bowel disease and drug reactions.  The patient understands that monitoring is required including a PPD at baseline and must alert us or the primary physician if symptoms of infection or other concerning signs are noted. Simponi Counseling:  I discussed with the patient the risks of golimumab including but not limited to myelosuppression, immunosuppression, autoimmune hepatitis, demyelinating diseases, lymphoma, and serious infections.  The patient understands that monitoring is required including a PPD at baseline and must alert us or the primary physician if symptoms of infection or other concerning signs are noted. Rifampin Counseling: I discussed with the patient the risks of rifampin including but not limited to liver damage, kidney damage, red-orange body fluids, nausea/vomiting and severe allergy. Oxybutynin Counseling:  I discussed with the patient the risks of oxybutynin including but not limited to skin rash, drowsiness, dry mouth, difficulty urinating, and blurred vision. Isotretinoin Counseling: Patient should get monthly blood tests, not donate blood, not drive at night if vision affected, not share medication, and not undergo elective surgery for 6 months after tx completed. Side effects reviewed, pt to contact office should one occur. Doxycycline Pregnancy And Lactation Text: This medication is Pregnancy Category D and not consider safe during pregnancy. It is also excreted in breast milk but is considered safe for shorter treatment courses. Bactrim Counseling:  I discussed with the patient the risks of sulfa antibiotics including but not limited to GI upset, allergic reaction, drug rash, diarrhea, dizziness, photosensitivity, and yeast infections.  Rarely, more serious reactions can occur including but not limited to aplastic anemia, agranulocytosis, methemoglobinemia, blood dyscrasias, liver or kidney failure, lung infiltrates or desquamative/blistering drug rashes. Griseofulvin Pregnancy And Lactation Text: This medication is Pregnancy Category X and is known to cause serious birth defects. It is unknown if this medication is excreted in breast milk but breast feeding should be avoided. Siliq Counseling:  I discussed with the patient the risks of Siliq including but not limited to new or worsening depression, suicidal thoughts and behavior, immunosuppression, malignancy, posterior leukoencephalopathy syndrome, and serious infections.  The patient understands that monitoring is required including a PPD at baseline and must alert us or the primary physician if symptoms of infection or other concerning signs are noted. There is also a special program designed to monitor depression which is required with Siliq. Topical Ketoconazole Counseling: Patient counseled that this medication may cause skin irritation or allergic reactions.  In the event of skin irritation, the patient was advised to reduce the amount of the drug applied or use it less frequently.   The patient verbalized understanding of the proper use and possible adverse effects of ketoconazole.  All of the patient's questions and concerns were addressed. Elidel Counseling: Patient may experience a mild burning sensation during topical application. Elidel is not approved in children less than 2 years of age. There have been case reports of hematologic and skin malignancies in patients using topical calcineurin inhibitors although causality is questionable. Detail Level: Detailed Niacinamide Pregnancy And Lactation Text: These medications are considered safe during pregnancy. Low Dose Naltrexone Pregnancy And Lactation Text: Naltrexone is pregnancy category C.  There have been no adequate and well-controlled studies in pregnant women.  It should be used in pregnancy only if the potential benefit justifies the potential risk to the fetus.   Limited data indicates that naltrexone is minimally excreted into breastmilk. Doxepin Counseling:  Patient advised that the medication is sedating and not to drive a car after taking this medication. Patient informed of potential adverse effects including but not limited to dry mouth, urinary retention, and blurry vision.  The patient verbalized understanding of the proper use and possible adverse effects of doxepin.  All of the patient's questions and concerns were addressed. Sotyktu Counseling:  I discussed the most common side effects of Sotyktu including: common cold, sore throat, sinus infections, cold sores, canker sores, folliculitis, and acne.  I also discussed more serious side effects of Sotyktu including but not limited to: serious allergic reactions; increased risk for infections such as TB; cancers such as lymphomas; rhabdomyolysis and elevated CPK; and elevated triglycerides and liver enzymes.  Methotrexate Pregnancy And Lactation Text: This medication is Pregnancy Category X and is known to cause fetal harm. This medication is excreted in breast milk. Azathioprine Counseling:  I discussed with the patient the risks of azathioprine including but not limited to myelosuppression, immunosuppression, hepatotoxicity, lymphoma, and infections.  The patient understands that monitoring is required including baseline LFTs, Creatinine, possible TPMP genotyping and weekly CBCs for the first month and then every 2 weeks thereafter.  The patient verbalized understanding of the proper use and possible adverse effects of azathioprine.  All of the patient's questions and concerns were addressed. Adbry Counseling: I discussed with the patient the risks of tralokinumab including but not limited to eye infection and irritation, cold sores, injection site reactions, worsening of asthma, allergic reactions and increased risk of parasitic infection.  Live vaccines should be avoided while taking tralokinumab. The patient understands that monitoring is required and they must alert us or the primary physician if symptoms of infection or other concerning signs are noted. Dapsone Counseling: I discussed with the patient the risks of dapsone including but not limited to hemolytic anemia, agranulocytosis, rashes, methemoglobinemia, kidney failure, peripheral neuropathy, headaches, GI upset, and liver toxicity.  Patients who start dapsone require monitoring including baseline LFTs and weekly CBCs for the first month, then every month thereafter.  The patient verbalized understanding of the proper use and possible adverse effects of dapsone.  All of the patient's questions and concerns were addressed. Valtrex Counseling: I discussed with the patient the risks of valacyclovir including but not limited to kidney damage, nausea, vomiting and severe allergy.  The patient understands that if the infection seems to be worsening or is not improving, they are to call. Mirvaso Counseling: Mirvaso is a topical medication which can decrease superficial blood flow where applied. Side effects are uncommon and include stinging, redness and allergic reactions. Isotretinoin Pregnancy And Lactation Text: This medication is Pregnancy Category X and is considered extremely dangerous during pregnancy. It is unknown if it is excreted in breast milk. Rifampin Pregnancy And Lactation Text: This medication is Pregnancy Category C and it isn't know if it is safe during pregnancy. It is also excreted in breast milk and should not be used if you are breast feeding. Erythromycin Counseling:  I discussed with the patient the risks of erythromycin including but not limited to GI upset, allergic reaction, drug rash, diarrhea, increase in liver enzymes, and yeast infections. Nsaids Counseling: NSAID Counseling: I discussed with the patient that NSAIDs should be taken with food. Prolonged use of NSAIDs can result in the development of stomach ulcers.  Patient advised to stop taking NSAIDs if abdominal pain occurs.  The patient verbalized understanding of the proper use and possible adverse effects of NSAIDs.  All of the patient's questions and concerns were addressed. Itraconazole Counseling:  I discussed with the patient the risks of itraconazole including but not limited to liver damage, nausea/vomiting, neuropathy, and severe allergy.  The patient understands that this medication is best absorbed when taken with acidic beverages such as non-diet cola or ginger ale.  The patient understands that monitoring is required including baseline LFTs and repeat LFTs at intervals.  The patient understands that they are to contact us or the primary physician if concerning signs are noted.

## 2024-11-13 NOTE — PATIENT PROFILE ADULT - LEGAL HELP
Chief Complaint  Follow-up (The patient is coming in for a 6 month follow up, labs done. The patient has no concerns. )    Subjective      History of Present Illness  The patient is a 61-year-old female who presents for a follow-up for hypertension, hyperlipidemia, hypothyroidism, anxiety, prediabetes, and vitamin D deficiency.    She reports no adverse effects from her current medications. She underwent blood work last week.    For hypertension, she is currently taking atenolol 25 mg and atorvastatin 10 mg daily. Her home blood pressure readings are typically borderline high. She is not under the care of a cardiologist. She does not engage in regular exercise and has noticed weight gain, being aware of the need for weight loss. She denies experiencing chest pain or shortness of breath, even during physical exertion such as walking from the parking lot. She also reports no leg swelling. Her heart rate at home is usually between 90 and 100 beats per minute. She occasionally experiences lightheadedness, which she attributes to her ears.    For hyperlipidemia, she is taking atorvastatin 10 mg daily.    For hypothyroidism, she did not mention specific medications in this visit.    For anxiety, she is on a daily dose of citalopram 20 mg, which she reports is effective.    For prediabetes, she did not mention specific medications or treatments in this visit.    For vitamin D deficiency, she is taking vitamin D 2000 units daily.    IMMUNIZATIONS  She has received her influenza vaccine this year.       Past Medical History:   Diagnosis Date    Anemia, unspecified     Atrophy of thyroid (acquired)     Colon polyp 03/2020    Decreased white blood cell count, unspecified     Depression     Elevated ALT measurement     Essential (primary) hypertension     Hyperlipidemia with low HDL     hyperlipidemia with hypertriglyceridemia and low HDL    Hypothyroidism, unspecified     Impaired fasting glucose     Leukopenia     Lichen  "planus 11/09/2022    Liver disease 03/2021    [facility]    Major depressive disorder, single episode, unspecified     Metabolic syndrome     Mixed hyperlipidemia     Vitamin D deficiency, unspecified         Past Surgical History:   Procedure Laterality Date    COLONOSCOPY  2020    COLONOSCOPY N/A 12/20/2023    Procedure: COLONOSCOPY;  Surgeon: Jacinto Rhodes MD;  Location: [facility] ENDOSCOPY;  Service: General;  Laterality: N/A;  diverticulosis    DILATATION AND CURETTAGE      LIVER BIOPSY  01/18/2020        Social History     Tobacco Use   Smoking Status Never   Smokeless Tobacco Never        Patient Care Team:  Barby Kearns APRN as PCP - General (Nurse Practitioner)  Richard Choe MD (Obstetrics and Gynecology)    Allergies   Allergen Reactions    Amoxicillin-Pot Clavulanate Hives          Current Outpatient Medications:     Cholecalciferol (Vitamin D) 50 MCG (2000 UT) tablet, Take 1 tablet by mouth Daily., Disp: , Rfl:     citalopram (CeleXA) 20 MG tablet, Take 1 tablet by mouth Daily for 90 days., Disp: 90 tablet, Rfl: 3    levothyroxine (Synthroid) 100 MCG tablet, Take 1 tablet by mouth Every Morning., Disp: 90 tablet, Rfl: 1    loratadine (CLARITIN) 10 MG tablet, loratadine 10 mg oral tablet take 1 tablet (10 mg) by oral route once daily   Active, Disp: , Rfl:     vitamin E 400 UNIT capsule, TAKE 2 CAPSULES BY MOUTH EVERY DAY, Disp: , Rfl:     atenolol (Tenormin) 50 MG tablet, Take 1 tablet by mouth Daily., Disp: 90 tablet, Rfl: 1    atorvastatin (Lipitor) 20 MG tablet, Take 1 tablet by mouth Daily., Disp: 90 tablet, Rfl: 1    Objective     Vitals:    11/18/24 1024   BP: 132/90   BP Location: Left arm   Patient Position: Sitting   Cuff Size: Large Adult   Pulse: 68   Temp: 97.5 °F (36.4 °C)   TempSrc: Temporal   SpO2: 98%   Weight: 88.8 kg (195 lb 12.8 oz)   Height: 162.6 cm (64\")   PainSc: 0-No pain        Wt Readings from Last 3 Encounters:   11/18/24 88.8 kg (195 lb 12.8 oz)   05/17/24 87.6 kg " (193 lb 3.2 oz)   12/20/23 77 kg (169 lb 12.1 oz)        BP Readings from Last 3 Encounters:   11/18/24 132/90   05/17/24 144/68   12/20/23 120/88        Physical Exam  Vital Signs  Vitals show a blood pressure of 132/90. Heart rate is 68.    Physical Exam  Vitals reviewed.   Constitutional:       General: She is not in acute distress.  HENT:      Head: Normocephalic and atraumatic.   Cardiovascular:      Rate and Rhythm: Normal rate and regular rhythm.   Pulmonary:      Effort: Pulmonary effort is normal.      Breath sounds: Normal breath sounds. No wheezing, rhonchi or rales.   Musculoskeletal:      Right lower leg: No edema.      Left lower leg: No edema.   Skin:     General: Skin is warm and dry.   Neurological:      General: No focal deficit present.      Mental Status: She is alert.   Psychiatric:         Thought Content: Thought content normal.                Result Review   The following data was reviewed by: YOLANDA Ordaz on 11/18/2024:  [x]  Tests & Results  []  Hospitalization/Emergency Department/Urgent Care  []  Internal/External Consultant Notes       Assessment and Plan     Diagnoses and all orders for this visit:    1. Essential hypertension (Primary)  -     Comprehensive Metabolic Panel; Future  -     CBC & Differential; Future    2. Hyperlipidemia, unspecified hyperlipidemia type  -     Lipid Panel; Future    3. Hypothyroidism, unspecified type  -     TSH+Free T4; Future    4. Anxiety    5. Prediabetes  -     Hemoglobin A1c; Future    6. Vitamin D deficiency  -     Vitamin D,25-Hydroxy; Future    Other orders  -     atorvastatin (Lipitor) 20 MG tablet; Take 1 tablet by mouth Daily.  Dispense: 90 tablet; Refill: 1  -     atenolol (Tenormin) 50 MG tablet; Take 1 tablet by mouth Daily.  Dispense: 90 tablet; Refill: 1  -     levothyroxine (Synthroid) 100 MCG tablet; Take 1 tablet by mouth Every Morning.  Dispense: 90 tablet; Refill: 1         Assessment & Plan  1. Hypertension.  Her blood  pressure is slightly elevated at 132/90. She is currently taking atenolol 25 mg daily. The dosage of atenolol will be increased to 50 mg to better control her blood pressure. She is instructed to monitor her blood pressure and heart rate twice daily for the next 2 weeks and report the readings. If her heart rate drops or she experiences any symptoms, adjustments will be made.    2. Hyperlipidemia.  Her LDL cholesterol has increased to 127 from 112. The dosage of atorvastatin will be increased to 20 mg to achieve tighter control. She is advised to continue with diet and exercise to help manage her cholesterol levels.    3. Hypothyroidism.  Her thyroid levels are within the normal range. She is currently taking levothyroxine 100 mcg daily. She will continue this dosage. Her thyroid medication will be refilled to ensure she does not run out.    4. Anxiety.  She is currently taking citalopram 20 mg daily for anxiety and reports doing well on this medication. No changes will be made to her current regimen.    5. Prediabetes.  Her hemoglobin A1c is 5.8, which is an improvement from 6 months ago. She remains prediabetic. She is advised to continue with lifestyle modifications, including diet and exercise, to further improve her blood sugar levels.    6. Vitamin D Deficiency.  Her vitamin D level is 36.8, which is within the normal range. She is currently taking 2000 units of vitamin D daily and will continue this dosage to maintain adequate levels.    7. Health Maintenance.  She has already received her flu shot for the year. Blood work will be ordered again in 6 months to monitor her progress.    Follow-up  Patient will return in 6 months for her annual wellness visit.       BMI is >= 30 and <35. (Class 1 Obesity). The following options were offered after discussion;: exercise counseling/recommendations and nutrition counseling/recommendations       Patient was given instructions and counseling regarding her condition or  for health maintenance advice. Please see specific information pulled into the AVS if appropriate.     Follow Up   Return in about 6 months (around 5/18/2025) for Annual physical.    Patient or patient representative verbalized consent for the use of Ambient Listening during the visit with  YOLANDA rOdaz for chart documentation. 11/18/2024  10:38 EST    YOLANDA Ordaz   no